# Patient Record
Sex: FEMALE | Race: OTHER | HISPANIC OR LATINO
[De-identification: names, ages, dates, MRNs, and addresses within clinical notes are randomized per-mention and may not be internally consistent; named-entity substitution may affect disease eponyms.]

---

## 2022-11-18 VITALS — BODY MASS INDEX: 45.85 KG/M2 | WEIGHT: 288.69 LBS | HEIGHT: 66.5 IN

## 2022-12-09 VITALS — BODY MASS INDEX: 45.74 KG/M2 | WEIGHT: 288 LBS | HEIGHT: 66.5 IN

## 2023-01-15 VITALS — BODY MASS INDEX: 45.42 KG/M2 | HEIGHT: 66.5 IN | WEIGHT: 286 LBS

## 2023-02-07 ENCOUNTER — NON-APPOINTMENT (OUTPATIENT)
Age: 38
End: 2023-02-07

## 2023-02-08 ENCOUNTER — APPOINTMENT (OUTPATIENT)
Dept: BARIATRICS | Facility: CLINIC | Age: 38
End: 2023-02-08
Payer: MEDICAID

## 2023-02-08 VITALS
TEMPERATURE: 97.3 F | HEART RATE: 67 BPM | WEIGHT: 291 LBS | SYSTOLIC BLOOD PRESSURE: 125 MMHG | OXYGEN SATURATION: 98 % | HEIGHT: 66.5 IN | DIASTOLIC BLOOD PRESSURE: 83 MMHG | BODY MASS INDEX: 46.22 KG/M2

## 2023-02-08 PROCEDURE — 99204 OFFICE O/P NEW MOD 45 MIN: CPT

## 2023-02-08 NOTE — HISTORY OF PRESENT ILLNESS
[de-identified] : Pt is a 36 y/o F with pmhx of morbid obesity BMI 46, asthma on albuterol prn, who presents today feeling well here for consultation for bariatric sx. Pt states she has struggled with her wt after giving birth to her 6th child a few years ago, states she has 9 children. Reports past sxhx of 2 c sections of spinal surgery for hematoma evacuation following a fall. Denies any reflux or regurgitation. States she quit smoking 5 yrs ago, reports social alcohol use. 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 States she has seen our online seminar and is interested in the VSG. We discussed at length surgical and non-surgical options and that non surgical approaches are unlikely to lead to long term, sustained weight loss. We also discussed that surgery alone is unlikely to be successful but should rather be seen as a tool for weight loss to be integrated with physical activity and nutritional counseling. The patient verbalized understanding and agrees to proceed with the evaluation. All risks of surgery were explained to the patient including the risks of leaks, infections, blood clots and death.\par \par

## 2023-02-08 NOTE — ADDENDUM
[FreeTextEntry1] : It was my pleasure to interact with Ms. BIPIN CAMPOVERDE today. In summary, Ms. BIPIN CAMPOVERDE has morbid obesity refractory to medical and dietary efforts for which She could benefit from weight loss surgery.  We discussed in detail the Mary-en-Y gastric bypass, sleeve gastrectomy, and modified duodenal switch (KAYLEE/SIPS). She understood that these procedures are done primarily minimally invasively (laparoscopically or robotically), but that there is a possibility of conversion to laparotomy. In this particular case, with their body mass index we discussed realistic expectations with respect to weight loss.  Approximately 50% of excess weight will be lost if She has gastric bypass or sleeve gastrectomy, or, approximately 60% if She  has KAYLEE/SIPS.  In order to maintain weight loss or lose more weight, She will be required to modify diet and exercise habits (the "tool" concept of weight loss surgery).  We discussed the necessity for continuous, life-long medical care following surgery and the necessity for taking mineral and vitamin supplements daily for the rest of life. We discussed the importance of high protein diet and daily exercise for maximal success.\par \par We discussed complications that may follow bariatric surgery that include, but are not limited to, respiratory problems, pneumonia, thrombophlebitis, and pulmonary embolus.  We also discussed intra-abdominal complications including anastomotic leak, intra-abdominal infections, portal vein thrombosis and death that may result from bariatric surgery.  We discussed that there may be other complications related to a specific type of surgery, such as that gastric bypass patients may have severe dumping secondary to dietary indiscretion. With respect to sleeve gastrectomy we discussed the chances of having refractory GERD that may require intervention in the future including conversion to gastric bypass. We also discussed postoperative DVT prophylaxis to decrease the incidence of deep vein thrombosis when indicated. \par \par I specifically addressed the patient regarding pregnancy.  Weight loss surgery patients should not become pregnant in the first two years following surgery because of the high risk of fetal malformation and miscarriage.\par \par The prolonged discussion (greater than 45 minutes) centered primarily on the indications for surgery and evaluation of the risk/benefit ratio for Ms. BIPIN CAMPOVERDE and other weight loss alternatives. I answered all questions about bariatric surgery and possible complications to the best of my ability.\par \par Once the preoperative evaluation is complete, I will review the chart and schedule the patient for a follow-up visit in order to answer any questions prior to scheduling surgery.\par \par Plan:\par Interested in sleeve gastrectomy\par Nutrition, Psych evaluations\par Pulmonary evaluations\par EGD\par

## 2023-02-27 ENCOUNTER — APPOINTMENT (OUTPATIENT)
Dept: BARIATRICS | Facility: CLINIC | Age: 38
End: 2023-02-27

## 2023-03-03 ENCOUNTER — NON-APPOINTMENT (OUTPATIENT)
Age: 38
End: 2023-03-03

## 2023-03-04 ENCOUNTER — EMERGENCY (EMERGENCY)
Facility: HOSPITAL | Age: 38
LOS: 1 days | Discharge: LEFT BEFORE DISCHARGE | End: 2023-03-04
Admitting: EMERGENCY MEDICINE
Payer: COMMERCIAL

## 2023-03-04 VITALS
SYSTOLIC BLOOD PRESSURE: 115 MMHG | HEART RATE: 69 BPM | WEIGHT: 291.89 LBS | TEMPERATURE: 99 F | DIASTOLIC BLOOD PRESSURE: 65 MMHG | RESPIRATION RATE: 18 BRPM | OXYGEN SATURATION: 97 %

## 2023-03-04 LAB — SARS-COV-2 RNA SPEC QL NAA+PROBE: SIGNIFICANT CHANGE UP

## 2023-03-04 PROCEDURE — 87635 SARS-COV-2 COVID-19 AMP PRB: CPT

## 2023-03-04 PROCEDURE — 99284 EMERGENCY DEPT VISIT MOD MDM: CPT

## 2023-03-04 PROCEDURE — 99282 EMERGENCY DEPT VISIT SF MDM: CPT

## 2023-03-04 NOTE — ED ADULT TRIAGE NOTE - CAS EDN DISCHARGE ASSESSMENT
seen by CRYS Whitaker and discharged.  pt here for covid test only, pre procedure./Alert and oriented to person, place and time

## 2023-03-04 NOTE — ED PROVIDER NOTE - PATIENT PORTAL LINK FT
You can access the FollowMyHealth Patient Portal offered by  by registering at the following website: [email or web address]. By joining [facility]’s FollowMyHealth portal, you will also be able to view your health information using other applications (apps) compatible with our system.

## 2023-03-06 ENCOUNTER — RESULT REVIEW (OUTPATIENT)
Age: 38
End: 2023-03-06

## 2023-03-06 ENCOUNTER — TRANSCRIPTION ENCOUNTER (OUTPATIENT)
Age: 38
End: 2023-03-06

## 2023-03-06 ENCOUNTER — OUTPATIENT (OUTPATIENT)
Dept: OUTPATIENT SERVICES | Facility: HOSPITAL | Age: 38
LOS: 1 days | Discharge: HOME | End: 2023-03-06
Payer: COMMERCIAL

## 2023-03-06 VITALS — WEIGHT: 289.91 LBS

## 2023-03-06 PROCEDURE — 88305 TISSUE EXAM BY PATHOLOGIST: CPT

## 2023-03-06 PROCEDURE — 88300 SURGICAL PATH GROSS: CPT

## 2023-03-06 PROCEDURE — 88300 SURGICAL PATH GROSS: CPT | Mod: 26,59

## 2023-03-06 PROCEDURE — 43239 EGD BIOPSY SINGLE/MULTIPLE: CPT

## 2023-03-06 PROCEDURE — 88305 TISSUE EXAM BY PATHOLOGIST: CPT | Mod: 26

## 2023-03-07 DIAGNOSIS — Z20.822 CONTACT WITH AND (SUSPECTED) EXPOSURE TO COVID-19: ICD-10-CM

## 2023-03-07 DIAGNOSIS — Z88.0 ALLERGY STATUS TO PENICILLIN: ICD-10-CM

## 2023-03-07 DIAGNOSIS — Z53.29 PROCEDURE AND TREATMENT NOT CARRIED OUT BECAUSE OF PATIENT'S DECISION FOR OTHER REASONS: ICD-10-CM

## 2023-03-07 LAB — SURGICAL PATHOLOGY STUDY: SIGNIFICANT CHANGE UP

## 2023-03-09 VITALS — HEIGHT: 66.5 IN | WEIGHT: 286 LBS | BODY MASS INDEX: 45.42 KG/M2

## 2023-03-10 ENCOUNTER — APPOINTMENT (OUTPATIENT)
Dept: HEART AND VASCULAR | Facility: CLINIC | Age: 38
End: 2023-03-10

## 2023-03-10 DIAGNOSIS — Z01.810 ENCOUNTER FOR PREPROCEDURAL CARDIOVASCULAR EXAMINATION: ICD-10-CM

## 2023-03-10 DIAGNOSIS — R06.02 SHORTNESS OF BREATH: ICD-10-CM

## 2023-03-11 ENCOUNTER — NON-APPOINTMENT (OUTPATIENT)
Age: 38
End: 2023-03-11

## 2023-03-17 DIAGNOSIS — E55.9 VITAMIN D DEFICIENCY, UNSPECIFIED: ICD-10-CM

## 2023-03-17 LAB
25(OH)D3 SERPL-MCNC: 6.4 NG/ML
ALBUMIN SERPL ELPH-MCNC: 4.3 G/DL
ALP BLD-CCNC: 69 U/L
ALT SERPL-CCNC: 10 U/L
ANION GAP SERPL CALC-SCNC: 11 MMOL/L
APPEARANCE: CLEAR
AST SERPL-CCNC: 14 U/L
BACTERIA: NEGATIVE
BASOPHILS # BLD AUTO: 0.05 K/UL
BASOPHILS NFR BLD AUTO: 0.6 %
BILIRUB SERPL-MCNC: 0.4 MG/DL
BILIRUBIN URINE: NEGATIVE
BLOOD URINE: ABNORMAL
BUN SERPL-MCNC: 9 MG/DL
CA-I SERPL-SCNC: 4.9 MG/DL
CALCIUM SERPL-MCNC: 9 MG/DL
CALCIUM SERPL-MCNC: 9 MG/DL
CHLORIDE SERPL-SCNC: 102 MMOL/L
CHOLEST SERPL-MCNC: 124 MG/DL
CO2 SERPL-SCNC: 25 MMOL/L
COLOR: YELLOW
CREAT SERPL-MCNC: 0.74 MG/DL
EGFR: 106 ML/MIN/1.73M2
EOSINOPHIL # BLD AUTO: 0.14 K/UL
EOSINOPHIL NFR BLD AUTO: 1.7 %
ESTIMATED AVERAGE GLUCOSE: 105 MG/DL
FOLATE SERPL-MCNC: 2.4 NG/ML
GLUCOSE QUALITATIVE U: NEGATIVE
GLUCOSE SERPL-MCNC: 86 MG/DL
HBA1C MFR BLD HPLC: 5.3 %
HCG SERPL QL: NEGATIVE
HCT VFR BLD CALC: 40.1 %
HDLC SERPL-MCNC: 46 MG/DL
HGB BLD-MCNC: 12.8 G/DL
HYALINE CASTS: 2 /LPF
IMM GRANULOCYTES NFR BLD AUTO: 0.2 %
INR PPP: 1.11 RATIO
IRON SATN MFR SERPL: 17 %
IRON SERPL-MCNC: 55 UG/DL
KETONES URINE: NEGATIVE
LDLC SERPL CALC-MCNC: 65 MG/DL
LEUKOCYTE ESTERASE URINE: NEGATIVE
LYMPHOCYTES # BLD AUTO: 2.56 K/UL
LYMPHOCYTES NFR BLD AUTO: 31.6 %
MAN DIFF?: NORMAL
MCHC RBC-ENTMCNC: 28.1 PG
MCHC RBC-ENTMCNC: 31.9 GM/DL
MCV RBC AUTO: 88.1 FL
MICROSCOPIC-UA: NORMAL
MONOCYTES # BLD AUTO: 0.54 K/UL
MONOCYTES NFR BLD AUTO: 6.7 %
NEUTROPHILS # BLD AUTO: 4.79 K/UL
NEUTROPHILS NFR BLD AUTO: 59.2 %
NITRITE URINE: NEGATIVE
NONHDLC SERPL-MCNC: 78 MG/DL
PAPP-A SERPL-ACNC: <1 MIU/ML
PARATHYROID HORMONE INTACT: 115 PG/ML
PH URINE: 7
PLATELET # BLD AUTO: 331 K/UL
POTASSIUM SERPL-SCNC: 4.1 MMOL/L
PREALB SERPL NEPH-MCNC: 15 MG/DL
PROT SERPL-MCNC: 7.5 G/DL
PROTEIN URINE: ABNORMAL
PT BLD: 12.9 SEC
RBC # BLD: 4.55 M/UL
RBC # FLD: 12 %
RED BLOOD CELLS URINE: 20 /HPF
SODIUM SERPL-SCNC: 138 MMOL/L
SPECIFIC GRAVITY URINE: 1.03
SQUAMOUS EPITHELIAL CELLS: 6 /HPF
TIBC SERPL-MCNC: 327 UG/DL
TRIGL SERPL-MCNC: 65 MG/DL
TSH SERPL-ACNC: 3.03 UIU/ML
UIBC SERPL-MCNC: 272 UG/DL
UROBILINOGEN URINE: ABNORMAL
VIT B1 SERPL-MCNC: 116.1 NMOL/L
VIT B12 SERPL-MCNC: 265 PG/ML
WBC # FLD AUTO: 8.1 K/UL
WHITE BLOOD CELLS URINE: 2 /HPF
ZINC SERPL-MCNC: 59 UG/DL

## 2023-03-17 RX ORDER — FOLIC ACID 1 MG/1
1 TABLET ORAL DAILY
Qty: 30 | Refills: 0 | Status: ACTIVE | COMMUNITY
Start: 2023-03-17 | End: 1900-01-01

## 2023-03-17 RX ORDER — ERGOCALCIFEROL 1.25 MG/1
1.25 MG CAPSULE, LIQUID FILLED ORAL
Qty: 12 | Refills: 0 | Status: ACTIVE | COMMUNITY
Start: 2023-03-17 | End: 1900-01-01

## 2023-03-22 ENCOUNTER — APPOINTMENT (OUTPATIENT)
Dept: PULMONOLOGY | Facility: CLINIC | Age: 38
End: 2023-03-22
Payer: MEDICAID

## 2023-03-22 ENCOUNTER — TRANSCRIPTION ENCOUNTER (OUTPATIENT)
Age: 38
End: 2023-03-22

## 2023-03-22 VITALS
SYSTOLIC BLOOD PRESSURE: 121 MMHG | DIASTOLIC BLOOD PRESSURE: 79 MMHG | TEMPERATURE: 97.3 F | HEIGHT: 66.5 IN | OXYGEN SATURATION: 97 % | BODY MASS INDEX: 46.37 KG/M2 | WEIGHT: 292 LBS | HEART RATE: 67 BPM

## 2023-03-22 PROCEDURE — 99204 OFFICE O/P NEW MOD 45 MIN: CPT | Mod: 25

## 2023-03-22 PROCEDURE — 94727 GAS DIL/WSHOT DETER LNG VOL: CPT | Mod: 26

## 2023-03-22 PROCEDURE — 94729 DIFFUSING CAPACITY: CPT | Mod: 26

## 2023-03-22 PROCEDURE — ZZZZZ: CPT

## 2023-03-22 PROCEDURE — 94010 BREATHING CAPACITY TEST: CPT | Mod: 26

## 2023-03-22 NOTE — ASSESSMENT
[FreeTextEntry1] : 36 y/o obese F with snoring who is going under bariatric surgery clearance and now is here for initial visit and PFT.  Denies snoring or witnessed apnea, little excessive daytime somnolence despite very limited sleep time.  Based on history and physical exam, sleep disordered breathing is at least moderately likely.  The patient was advised to have overnight unattended home sleep testing as a first approach.  If this is not definitive may need overnight polysomnography. Will be seen in follow up after testing.

## 2023-03-22 NOTE — HISTORY OF PRESENT ILLNESS
[FreeTextEntry1] : 03/22/2023 :  BIPIN CAMPOVERDE is a 37 year old female with PMHx asthma ( well controlled only on albuterol once/month), obesity who is here for initial visit for bariatric surgery clearance. \par \par She snores but denies apnea and has occasional dry mouth. Her  mother with SANDHYA on CPAP \par \par Sleep Routine:\par \par She goes to bed at 2A, sleep latency is about 1 min, she wakes up once WASO is about 1 min, and then she is up at 6A. She naps twice/week x 30 min . [place] sleepiness scale (out of 24 points) = 8 \par \par She gained about 6lb in few weeks. She denies cataplexy, RLS, parasomnia, or any other sleep behavioral issues. \par   \par Noo hx smoking, asthma, or any other pulmonary disease.\par

## 2023-03-22 NOTE — PHYSICAL EXAM
[General Appearance - In No Acute Distress] : no acute distress [Micrognathia] : micrognathia [III] : III [Apical Impulse] : the apical impulse was normal [Heart Sounds] : normal S1 and S2 [] : no respiratory distress [Exaggerated Use Of Accessory Muscles For Inspiration] : no accessory muscle use [Abnormal Walk] : normal gait [Involuntary Movements] : no involuntary movements were seen [No Focal Deficits] : no focal deficits [Oriented To Time, Place, And Person] : oriented to person, place, and time [Affect] : the affect was normal [FreeTextEntry1] : large neck

## 2023-03-22 NOTE — REVIEW OF SYSTEMS
[EDS: ESS=____] : daytime somnolence: ESS=[unfilled] [Snoring] : snoring [Obesity] : obesity [Negative] : Psychiatric

## 2023-03-30 ENCOUNTER — NON-APPOINTMENT (OUTPATIENT)
Age: 38
End: 2023-03-30

## 2023-04-03 ENCOUNTER — NON-APPOINTMENT (OUTPATIENT)
Age: 38
End: 2023-04-03

## 2023-04-03 LAB
A-TOCOPHEROL VIT E SERPL-MCNC: 5.9 MG/L
BETA+GAMMA TOCOPHEROL SERPL-MCNC: 1.5 MG/L
UREA BREATH TEST QL: NEGATIVE
VIT A SERPL-MCNC: 17.8 UG/DL

## 2023-04-10 ENCOUNTER — OUTPATIENT (OUTPATIENT)
Dept: OUTPATIENT SERVICES | Facility: HOSPITAL | Age: 38
LOS: 1 days | End: 2023-04-10
Payer: COMMERCIAL

## 2023-04-10 ENCOUNTER — APPOINTMENT (OUTPATIENT)
Dept: SLEEP CENTER | Facility: HOME HEALTH | Age: 38
End: 2023-04-10
Payer: MEDICAID

## 2023-04-10 VITALS — BODY MASS INDEX: 45.76 KG/M2 | HEIGHT: 66.5 IN | WEIGHT: 288.13 LBS

## 2023-04-10 PROCEDURE — 95800 SLP STDY UNATTENDED: CPT | Mod: 26

## 2023-04-10 PROCEDURE — 95800 SLP STDY UNATTENDED: CPT

## 2023-04-12 DIAGNOSIS — G47.33 OBSTRUCTIVE SLEEP APNEA (ADULT) (PEDIATRIC): ICD-10-CM

## 2023-04-27 ENCOUNTER — APPOINTMENT (OUTPATIENT)
Dept: PULMONOLOGY | Facility: CLINIC | Age: 38
End: 2023-04-27
Payer: MEDICAID

## 2023-04-27 DIAGNOSIS — Z01.818 ENCOUNTER FOR OTHER PREPROCEDURAL EXAMINATION: ICD-10-CM

## 2023-04-27 DIAGNOSIS — R06.83 SNORING: ICD-10-CM

## 2023-04-27 PROCEDURE — 99442: CPT

## 2023-04-27 NOTE — ASSESSMENT
[FreeTextEntry1] : Mild sleep disordered breathing.  I do not think she needs treatment for this at this time and expect that if she undergoes bariatric surgery and loses weight this will improve.  3 of asthma but normal pulmonary function testing.\par \par Cleared for bariatric surgery from our perspective.

## 2023-05-03 ENCOUNTER — APPOINTMENT (OUTPATIENT)
Dept: BARIATRICS | Facility: CLINIC | Age: 38
End: 2023-05-03
Payer: MEDICAID

## 2023-05-03 ENCOUNTER — LABORATORY RESULT (OUTPATIENT)
Age: 38
End: 2023-05-03

## 2023-05-03 VITALS
HEART RATE: 65 BPM | OXYGEN SATURATION: 99 % | SYSTOLIC BLOOD PRESSURE: 135 MMHG | HEIGHT: 66.5 IN | WEIGHT: 292 LBS | TEMPERATURE: 97.3 F | BODY MASS INDEX: 46.37 KG/M2 | DIASTOLIC BLOOD PRESSURE: 87 MMHG

## 2023-05-03 DIAGNOSIS — J45.909 UNSPECIFIED ASTHMA, UNCOMPLICATED: ICD-10-CM

## 2023-05-03 DIAGNOSIS — E66.01 MORBID (SEVERE) OBESITY DUE TO EXCESS CALORIES: ICD-10-CM

## 2023-05-03 PROCEDURE — 99214 OFFICE O/P EST MOD 30 MIN: CPT

## 2023-05-04 NOTE — ASSESSMENT
[FreeTextEntry1] : Pt is a 36 y/o F with pmhx of morbid obesity BMI 46, asthma, who presents today feeling well here for final visit for bariatric sx. Pt has completed her workup for surgery and has obtained all of the necessary clearances and weigh ins. Pt EGD reviewed, no evidence of HH and path overall wnl. Pt expresses interest in VSG. Pt understands the risk of GERD occurring post op and wishes to proceed. We discussed at length surgical and non-surgical options and that non surgical approaches are unlikely to lead to long term, sustained weight loss. We also discussed that surgery alone is unlikely to be successful but should rather be seen as a tool for weight loss to be integrated with physical activity and nutritional counseling. The patient verbalized understanding and agrees to proceed with the evaluation. All risks of surgery were explained to the patient including the risks of leaks, infections, blood clots and death.

## 2023-05-04 NOTE — ADDENDUM
[FreeTextEntry1] : It was my pleasure to interact with Ms. BIPIN CAMPOVERDE today. In summary, Ms. BIPIN CAMPOVERDE has morbid obesity refractory to medical and dietary efforts for which She could benefit from weight loss surgery.  We discussed in detail the Mary-en-Y gastric bypass, sleeve gastrectomy, and modified duodenal switch (KAYLEE/SIPS). She understood that these procedures are done primarily minimally invasively (laparoscopically or robotically), but that there is a possibility of conversion to laparotomy. In this particular case, with their body mass index we discussed realistic expectations with respect to weight loss.  Approximately 50% of excess weight will be lost if She has gastric bypass or sleeve gastrectomy, or, approximately 60% if She  has KAYLEE/SIPS.  In order to maintain weight loss or lose more weight, She will be required to modify diet and exercise habits (the "tool" concept of weight loss surgery).  We discussed the necessity for continuous, life-long medical care following surgery and the necessity for taking mineral and vitamin supplements daily for the rest of life. We discussed the importance of high protein diet and daily exercise for maximal success.\par \par We discussed complications that may follow bariatric surgery that include, but are not limited to, respiratory problems, pneumonia, thrombophlebitis, and pulmonary embolus.  We also discussed intra-abdominal complications including anastomotic leak, intra-abdominal infections, portal vein thrombosis and death that may result from bariatric surgery.  We discussed that there may be other complications related to a specific type of surgery, such as that gastric bypass patients may have severe dumping secondary to dietary indiscretion. With respect to sleeve gastrectomy we discussed the chances of having refractory GERD that may require intervention in the future including conversion to gastric bypass. We also discussed postoperative DVT prophylaxis to decrease the incidence of deep vein thrombosis when indicated. \par \par I specifically addressed the patient regarding pregnancy.  Weight loss surgery patients should not become pregnant in the first two years following surgery because of the high risk of fetal malformation and miscarriage.\par \par The prolonged discussion (greater than 50 minutes) centered primarily on the indications for surgery and evaluation of the risk/benefit ratio for Ms. BIPIN CAMPOVERDE and other weight loss alternatives. I answered all questions about bariatric surgery and possible complications to the best of my ability.\par \par Once the preoperative evaluation is complete, I will review the chart and schedule the patient for a follow-up visit in order to answer any questions prior to scheduling surgery.\par \par Plan:\par robotic assisted sleeve gastrectomy, possible hiatal hernia repair\par verify preoperative clearances\par \par I, Dr. Gi Joyce, spent 35 minutes with the patient >50% counseling/coordination of care including, reviewing the patient's history, performing an examination, reviewing relevant labs and radiographic imaging, reviewing PCP and consultant notes, discussion of medical and surgical management of the diagnosis as well as associated risks and benefits, and completing documentation.\par

## 2023-05-04 NOTE — HISTORY OF PRESENT ILLNESS
[de-identified] : Pt is a 36 y/o F with pmhx of morbid obesity BMI 46, asthma, who presents today feeling well here for final visit for bariatric sx. Pt has completed her workup for surgery and has obtained all of the necessary clearances and weigh ins. Pt EGD reviewed, no evidence of HH and path overall wnl. Pt expresses interest in VSG. Pt understands the risk of GERD occurring post op and wishes to proceed. We discussed at length surgical and non-surgical options and that non surgical approaches are unlikely to lead to long term, sustained weight loss. We also discussed that surgery alone is unlikely to be successful but should rather be seen as a tool for weight loss to be integrated with physical activity and nutritional counseling. The patient verbalized understanding and agrees to proceed with the evaluation. All risks of surgery were explained to the patient including the risks of leaks, infections, blood clots and death.\par

## 2023-05-16 ENCOUNTER — APPOINTMENT (OUTPATIENT)
Dept: BARIATRICS | Facility: CLINIC | Age: 38
End: 2023-05-16

## 2023-05-16 ENCOUNTER — NON-APPOINTMENT (OUTPATIENT)
Age: 38
End: 2023-05-16

## 2023-05-19 ENCOUNTER — RESULT REVIEW (OUTPATIENT)
Age: 38
End: 2023-05-19

## 2023-05-19 ENCOUNTER — OUTPATIENT (OUTPATIENT)
Dept: OUTPATIENT SERVICES | Facility: HOSPITAL | Age: 38
LOS: 1 days | End: 2023-05-19
Payer: COMMERCIAL

## 2023-05-19 PROCEDURE — 71046 X-RAY EXAM CHEST 2 VIEWS: CPT | Mod: 26

## 2023-05-19 PROCEDURE — 71046 X-RAY EXAM CHEST 2 VIEWS: CPT

## 2023-05-25 ENCOUNTER — LABORATORY RESULT (OUTPATIENT)
Age: 38
End: 2023-05-25

## 2023-05-26 VITALS
RESPIRATION RATE: 17 BRPM | HEART RATE: 75 BPM | WEIGHT: 293 LBS | OXYGEN SATURATION: 97 % | HEIGHT: 67 IN | DIASTOLIC BLOOD PRESSURE: 80 MMHG | TEMPERATURE: 97 F | SYSTOLIC BLOOD PRESSURE: 123 MMHG

## 2023-05-26 NOTE — PATIENT PROFILE ADULT - FALL HARM RISK - UNIVERSAL INTERVENTIONS
Bed in lowest position, wheels locked, appropriate side rails in place/Call bell, personal items and telephone in reach/Instruct patient to call for assistance before getting out of bed or chair/Non-slip footwear when patient is out of bed/[place] to call system/Physically safe environment - no spills, clutter or unnecessary equipment/Purposeful Proactive Rounding/Room/bathroom lighting operational, light cord in reach

## 2023-05-29 ENCOUNTER — TRANSCRIPTION ENCOUNTER (OUTPATIENT)
Age: 38
End: 2023-05-29

## 2023-05-30 ENCOUNTER — APPOINTMENT (OUTPATIENT)
Dept: BARIATRICS | Facility: HOSPITAL | Age: 38
End: 2023-05-30

## 2023-05-30 ENCOUNTER — RESULT REVIEW (OUTPATIENT)
Age: 38
End: 2023-05-30

## 2023-05-30 ENCOUNTER — TRANSCRIPTION ENCOUNTER (OUTPATIENT)
Age: 38
End: 2023-05-30

## 2023-05-30 ENCOUNTER — INPATIENT (INPATIENT)
Facility: HOSPITAL | Age: 38
LOS: 2 days | Discharge: HOME | DRG: 621 | End: 2023-06-02
Attending: STUDENT IN AN ORGANIZED HEALTH CARE EDUCATION/TRAINING PROGRAM | Admitting: STUDENT IN AN ORGANIZED HEALTH CARE EDUCATION/TRAINING PROGRAM
Payer: COMMERCIAL

## 2023-05-30 DIAGNOSIS — Z98.890 OTHER SPECIFIED POSTPROCEDURAL STATES: Chronic | ICD-10-CM

## 2023-05-30 DIAGNOSIS — Z98.891 HISTORY OF UTERINE SCAR FROM PREVIOUS SURGERY: Chronic | ICD-10-CM

## 2023-05-30 LAB
BLD GP AB SCN SERPL QL: NEGATIVE — SIGNIFICANT CHANGE UP
HCT VFR BLD CALC: 37.7 % — SIGNIFICANT CHANGE UP (ref 34.5–45)
HGB BLD-MCNC: 12.2 G/DL — SIGNIFICANT CHANGE UP (ref 11.5–15.5)
MCHC RBC-ENTMCNC: 27.9 PG — SIGNIFICANT CHANGE UP (ref 27–34)
MCHC RBC-ENTMCNC: 32.4 GM/DL — SIGNIFICANT CHANGE UP (ref 32–36)
MCV RBC AUTO: 86.3 FL — SIGNIFICANT CHANGE UP (ref 80–100)
NRBC # BLD: 0 /100 WBCS — SIGNIFICANT CHANGE UP (ref 0–0)
PLATELET # BLD AUTO: 287 K/UL — SIGNIFICANT CHANGE UP (ref 150–400)
RBC # BLD: 4.37 M/UL — SIGNIFICANT CHANGE UP (ref 3.8–5.2)
RBC # FLD: 11.9 % — SIGNIFICANT CHANGE UP (ref 10.3–14.5)
RH IG SCN BLD-IMP: POSITIVE — SIGNIFICANT CHANGE UP
WBC # BLD: 12.23 K/UL — HIGH (ref 3.8–10.5)
WBC # FLD AUTO: 12.23 K/UL — HIGH (ref 3.8–10.5)

## 2023-05-30 PROCEDURE — 88307 TISSUE EXAM BY PATHOLOGIST: CPT | Mod: 26

## 2023-05-30 PROCEDURE — S2900 ROBOTIC SURGICAL SYSTEM: CPT | Mod: NC

## 2023-05-30 PROCEDURE — 88342 IMHCHEM/IMCYTCHM 1ST ANTB: CPT | Mod: 26

## 2023-05-30 PROCEDURE — 43775 LAP SLEEVE GASTRECTOMY: CPT

## 2023-05-30 PROCEDURE — 43775 LAP SLEEVE GASTRECTOMY: CPT | Mod: AS

## 2023-05-30 DEVICE — XI STAPLER SUREFORM RELOAD 60 BLUE: Type: IMPLANTABLE DEVICE | Status: FUNCTIONAL

## 2023-05-30 DEVICE — XI STAPLER SUREFORM RELOAD 60 GREEN: Type: IMPLANTABLE DEVICE | Status: FUNCTIONAL

## 2023-05-30 DEVICE — ARISTA 3GR: Type: IMPLANTABLE DEVICE | Status: FUNCTIONAL

## 2023-05-30 RX ORDER — ALBUTEROL 90 UG/1
2 AEROSOL, METERED ORAL
Refills: 0 | DISCHARGE

## 2023-05-30 RX ORDER — ENOXAPARIN SODIUM 100 MG/ML
40 INJECTION SUBCUTANEOUS ONCE
Refills: 0 | Status: COMPLETED | OUTPATIENT
Start: 2023-05-30 | End: 2023-05-30

## 2023-05-30 RX ORDER — PANTOPRAZOLE SODIUM 20 MG/1
40 TABLET, DELAYED RELEASE ORAL DAILY
Refills: 0 | Status: DISCONTINUED | OUTPATIENT
Start: 2023-05-30 | End: 2023-06-02

## 2023-05-30 RX ORDER — GABAPENTIN 400 MG/1
0 CAPSULE ORAL
Refills: 0 | DISCHARGE

## 2023-05-30 RX ORDER — THIAMINE MONONITRATE (VIT B1) 100 MG
500 TABLET ORAL DAILY
Refills: 0 | Status: COMPLETED | OUTPATIENT
Start: 2023-05-30 | End: 2023-06-01

## 2023-05-30 RX ORDER — SCOPALAMINE 1 MG/3D
1 PATCH, EXTENDED RELEASE TRANSDERMAL ONCE
Refills: 0 | Status: COMPLETED | OUTPATIENT
Start: 2023-05-30 | End: 2023-05-30

## 2023-05-30 RX ORDER — ACETAMINOPHEN 500 MG
1000 TABLET ORAL ONCE
Refills: 0 | Status: COMPLETED | OUTPATIENT
Start: 2023-05-30 | End: 2023-05-30

## 2023-05-30 RX ORDER — HYOSCYAMINE SULFATE 0.13 MG
0.12 TABLET ORAL EVERY 6 HOURS
Refills: 0 | Status: DISCONTINUED | OUTPATIENT
Start: 2023-05-30 | End: 2023-06-02

## 2023-05-30 RX ORDER — ONDANSETRON 8 MG/1
4 TABLET, FILM COATED ORAL ONCE
Refills: 0 | Status: COMPLETED | OUTPATIENT
Start: 2023-05-30 | End: 2023-05-30

## 2023-05-30 RX ORDER — ONDANSETRON 8 MG/1
4 TABLET, FILM COATED ORAL EVERY 6 HOURS
Refills: 0 | Status: DISCONTINUED | OUTPATIENT
Start: 2023-05-30 | End: 2023-06-02

## 2023-05-30 RX ORDER — ALBUTEROL 90 UG/1
2 AEROSOL, METERED ORAL EVERY 6 HOURS
Refills: 0 | Status: DISCONTINUED | OUTPATIENT
Start: 2023-05-30 | End: 2023-06-02

## 2023-05-30 RX ORDER — HYDROMORPHONE HYDROCHLORIDE 2 MG/ML
0.5 INJECTION INTRAMUSCULAR; INTRAVENOUS; SUBCUTANEOUS
Refills: 0 | Status: DISCONTINUED | OUTPATIENT
Start: 2023-05-30 | End: 2023-05-30

## 2023-05-30 RX ORDER — HYDROMORPHONE HYDROCHLORIDE 2 MG/ML
0.25 INJECTION INTRAMUSCULAR; INTRAVENOUS; SUBCUTANEOUS ONCE
Refills: 0 | Status: DISCONTINUED | OUTPATIENT
Start: 2023-05-30 | End: 2023-05-30

## 2023-05-30 RX ORDER — ACETAMINOPHEN 500 MG
650 TABLET ORAL EVERY 6 HOURS
Refills: 0 | Status: DISCONTINUED | OUTPATIENT
Start: 2023-05-30 | End: 2023-06-01

## 2023-05-30 RX ORDER — SODIUM CHLORIDE 9 MG/ML
1000 INJECTION, SOLUTION INTRAVENOUS
Refills: 0 | Status: DISCONTINUED | OUTPATIENT
Start: 2023-05-30 | End: 2023-05-31

## 2023-05-30 RX ADMIN — ONDANSETRON 4 MILLIGRAM(S): 8 TABLET, FILM COATED ORAL at 17:22

## 2023-05-30 RX ADMIN — Medication 400 MILLIGRAM(S): at 18:14

## 2023-05-30 RX ADMIN — HYDROMORPHONE HYDROCHLORIDE 0.5 MILLIGRAM(S): 2 INJECTION INTRAMUSCULAR; INTRAVENOUS; SUBCUTANEOUS at 17:23

## 2023-05-30 RX ADMIN — Medication 0.12 MILLIGRAM(S): at 22:27

## 2023-05-30 RX ADMIN — ENOXAPARIN SODIUM 40 MILLIGRAM(S): 100 INJECTION SUBCUTANEOUS at 13:01

## 2023-05-30 RX ADMIN — SCOPALAMINE 1 PATCH: 1 PATCH, EXTENDED RELEASE TRANSDERMAL at 17:46

## 2023-05-30 RX ADMIN — Medication 1000 MILLIGRAM(S): at 13:02

## 2023-05-30 RX ADMIN — SODIUM CHLORIDE 175 MILLILITER(S): 9 INJECTION, SOLUTION INTRAVENOUS at 22:27

## 2023-05-30 RX ADMIN — HYDROMORPHONE HYDROCHLORIDE 0.5 MILLIGRAM(S): 2 INJECTION INTRAMUSCULAR; INTRAVENOUS; SUBCUTANEOUS at 18:09

## 2023-05-30 RX ADMIN — ONDANSETRON 4 MILLIGRAM(S): 8 TABLET, FILM COATED ORAL at 20:21

## 2023-05-30 RX ADMIN — PANTOPRAZOLE SODIUM 40 MILLIGRAM(S): 20 TABLET, DELAYED RELEASE ORAL at 17:10

## 2023-05-30 RX ADMIN — HYDROMORPHONE HYDROCHLORIDE 0.5 MILLIGRAM(S): 2 INJECTION INTRAMUSCULAR; INTRAVENOUS; SUBCUTANEOUS at 17:38

## 2023-05-30 RX ADMIN — HYDROMORPHONE HYDROCHLORIDE 0.25 MILLIGRAM(S): 2 INJECTION INTRAMUSCULAR; INTRAVENOUS; SUBCUTANEOUS at 21:44

## 2023-05-30 RX ADMIN — HYDROMORPHONE HYDROCHLORIDE 0.25 MILLIGRAM(S): 2 INJECTION INTRAMUSCULAR; INTRAVENOUS; SUBCUTANEOUS at 22:00

## 2023-05-30 RX ADMIN — HYDROMORPHONE HYDROCHLORIDE 0.5 MILLIGRAM(S): 2 INJECTION INTRAMUSCULAR; INTRAVENOUS; SUBCUTANEOUS at 17:57

## 2023-05-30 RX ADMIN — HYDROMORPHONE HYDROCHLORIDE 0.5 MILLIGRAM(S): 2 INJECTION INTRAMUSCULAR; INTRAVENOUS; SUBCUTANEOUS at 17:40

## 2023-05-30 RX ADMIN — SCOPALAMINE 1 PATCH: 1 PATCH, EXTENDED RELEASE TRANSDERMAL at 13:01

## 2023-05-30 NOTE — PROGRESS NOTE ADULT - SUBJECTIVE AND OBJECTIVE BOX
Procedure: Robot-assisted laparoscopic sleeve gastrectomy  Surgeon: Dr. Joyce    S: Pt seen and examined bedside. She is reporting gas pain and nausea. Emesis x1 in PACU. She has been up and walking. Tolerating minimal PO intake. Her surgical pain is controlled with medication. Denies CP, SOB, RODRIGUEZ, calf tenderness or edema, fever, or chills.     O:  T(C): 37.1 (05-30-23 @ 20:30), Max: 37.1 (05-30-23 @ 18:42)  T(F): 98.8 (05-30-23 @ 20:30), Max: 98.8 (05-30-23 @ 20:30)  HR: 76 (05-30-23 @ 20:30) (74 - 76)  BP: 129/86 (05-30-23 @ 20:30) (129/86 - 143/85)  RR: 17 (05-30-23 @ 20:30) (16 - 17)  SpO2: 95% (-30-23 @ 20:30) (95% - 96%)  Wt(kg): --                        12.2   12.23 )-----------( 287      ( 30 May 2023 22:22 )             37.7         Gen: NAD, resting in bed  C/V: NSR  Pulm: Nonlabored breathing, no respiratory distress  Abd: soft, NT/ND Incision: incisions are c/d/i  Extrem: WWP, no calf edema or tenderness      A/P: 37F with PMHx of MO (BMI 46), asthma, HTN, and psh of  section and back surgery who presents for elective weigthloss procedure. s/p LSG .      BCLD/IVF  Pain/nausea control PRN  Thiamine 500mg iv POD0 and POD1  Protonix 40mg iv qd  Hyocsyamine 0.125 mg q6h  Nutrition consult in AM  OOB/IS/SCD  SQH on POD1 after AM CBC

## 2023-05-30 NOTE — H&P ADULT - NSHPPHYSICALEXAM_GEN_ALL_CORE
Vital Signs Last 24 Hrs  T(C): 36.7 (30 May 2023 16:57), Max: 36.7 (30 May 2023 16:57)  T(F): 98 (30 May 2023 16:57), Max: 98 (30 May 2023 16:57)  HR: 68 (30 May 2023 17:07) (68 - 75)  BP: 141/71 (30 May 2023 17:02) (123/80 - 148/74)  BP(mean): 100 (30 May 2023 17:02) (100 - 104)  RR: 15 (30 May 2023 17:07) (15 - 17)  SpO2: 97% (30 May 2023 17:07) (95% - 97%)    Parameters below as of 30 May 2023 16:57  Patient On (Oxygen Delivery Method): nasal cannula  O2 Flow (L/min): 2    I&O's Detail      General: NAD, resting comfortably in bed  C/V: NSR  Pulm: Nonlabored breathing, no respiratory distress  Abd: soft, NT/ND.  Extrem: WWP, no edema

## 2023-05-30 NOTE — H&P ADULT - HISTORY OF PRESENT ILLNESS
HPI:  37F with PMHx of MO (BMI 46), asthma, HTN, and psh of  section and back surgery who presents for elective weigthloss procedure. Preop EGD negative for HHR and GERD.     PMH: MO (BMI 46), asthma, HTN  PSH:  section, back surgery  Allergies: penicillin  Medications: albuterol, gabapentin  SH: Former smoker, quit 5 years ago, Social EtOH use

## 2023-05-30 NOTE — H&P ADULT - NSICDXPASTSURGICALHX_GEN_ALL_CORE_FT
PAST SURGICAL HISTORY:  H/O:  ,     History of back surgery hematoma removal from the lower  back    History of D&C

## 2023-05-30 NOTE — H&P ADULT - NSICDXPASTMEDICALHX_GEN_ALL_CORE_FT
PAST MEDICAL HISTORY:  Acne     Asthma mild intermittent    Fall fallen from stairs, injury to the back in 2010    HTN (hypertension)

## 2023-05-30 NOTE — PRE-ANESTHESIA EVALUATION ADULT - NSANTHPMHFT_GEN_ALL_CORE
Cardiac: Positive for HTN. Denies HLD. MI/Angina/Heart Failure, Arrhythmia, Murmur/Valvular Disorder. >4 METS  Pulmonary: Positive for Asthma with multiple hospitalizations, last in 2012, intubated as child, inhaler use once per year.   Renal: Denies kidney dysfunction  Hepatic: Denies liver dysfunction  Gastrointestinal: Denies GERD/IBD  Endocrine: Denies DM or thyroid dysfunction  Neurologic: Denies stroke/seizure disorder  Hematologic: Denies anemia, blood clotting disorder, blood thinning medication.    PSH: Lumbar spine surgery for hematoma evacuation 2012, c section x 2, dilation and curettage.

## 2023-05-30 NOTE — BRIEF OPERATIVE NOTE - OPERATION/FINDINGS
Pneumoperitoneum obtained after veress needle insufflation at Guardado's point. Additional trocars placed along mid abdomen; left x2, right x2. Liver retractor placed. Robot docked. Cardia dissected to left abelino. Short gastrics ligated along greater curvature to 5cm from pylorus. Stomach divided over 38F bougie. Staple line reinforced with running V-lock. Abdomen inspected and hemostatic. Rubia applied along staple line. Robot undocked. Liver retractor removed.   Specimen removed from abdomen. Fascia closed with simple vicryl using alexandra dietrich. Skin closed with simple Monocryl.

## 2023-05-30 NOTE — PRE-ANESTHESIA EVALUATION ADULT - NSDENTALSD_ENT_ALL_CORE
Missing all upper teeth. Missing all lower molars. Lower front teeth with poor dentition./missing teeth

## 2023-05-31 ENCOUNTER — TRANSCRIPTION ENCOUNTER (OUTPATIENT)
Age: 38
End: 2023-05-31

## 2023-05-31 LAB
ANION GAP SERPL CALC-SCNC: 10 MMOL/L — SIGNIFICANT CHANGE UP (ref 5–17)
BUN SERPL-MCNC: 7 MG/DL — SIGNIFICANT CHANGE UP (ref 7–23)
CALCIUM SERPL-MCNC: 8.9 MG/DL — SIGNIFICANT CHANGE UP (ref 8.4–10.5)
CHLORIDE SERPL-SCNC: 101 MMOL/L — SIGNIFICANT CHANGE UP (ref 96–108)
CO2 SERPL-SCNC: 25 MMOL/L — SIGNIFICANT CHANGE UP (ref 22–31)
CREAT SERPL-MCNC: 0.61 MG/DL — SIGNIFICANT CHANGE UP (ref 0.5–1.3)
EGFR: 117 ML/MIN/1.73M2 — SIGNIFICANT CHANGE UP
GLUCOSE SERPL-MCNC: 124 MG/DL — HIGH (ref 70–99)
HCT VFR BLD CALC: 38.3 % — SIGNIFICANT CHANGE UP (ref 34.5–45)
HGB BLD-MCNC: 12.5 G/DL — SIGNIFICANT CHANGE UP (ref 11.5–15.5)
MAGNESIUM SERPL-MCNC: 1.8 MG/DL — SIGNIFICANT CHANGE UP (ref 1.6–2.6)
MCHC RBC-ENTMCNC: 28.1 PG — SIGNIFICANT CHANGE UP (ref 27–34)
MCHC RBC-ENTMCNC: 32.6 GM/DL — SIGNIFICANT CHANGE UP (ref 32–36)
MCV RBC AUTO: 86.1 FL — SIGNIFICANT CHANGE UP (ref 80–100)
NRBC # BLD: 0 /100 WBCS — SIGNIFICANT CHANGE UP (ref 0–0)
PHOSPHATE SERPL-MCNC: 3.2 MG/DL — SIGNIFICANT CHANGE UP (ref 2.5–4.5)
PLATELET # BLD AUTO: 318 K/UL — SIGNIFICANT CHANGE UP (ref 150–400)
POTASSIUM SERPL-MCNC: 4 MMOL/L — SIGNIFICANT CHANGE UP (ref 3.5–5.3)
POTASSIUM SERPL-SCNC: 4 MMOL/L — SIGNIFICANT CHANGE UP (ref 3.5–5.3)
RBC # BLD: 4.45 M/UL — SIGNIFICANT CHANGE UP (ref 3.8–5.2)
RBC # FLD: 12.1 % — SIGNIFICANT CHANGE UP (ref 10.3–14.5)
SODIUM SERPL-SCNC: 136 MMOL/L — SIGNIFICANT CHANGE UP (ref 135–145)
WBC # BLD: 11.37 K/UL — HIGH (ref 3.8–10.5)
WBC # FLD AUTO: 11.37 K/UL — HIGH (ref 3.8–10.5)

## 2023-05-31 RX ORDER — HYDROMORPHONE HYDROCHLORIDE 2 MG/ML
0.25 INJECTION INTRAMUSCULAR; INTRAVENOUS; SUBCUTANEOUS ONCE
Refills: 0 | Status: DISCONTINUED | OUTPATIENT
Start: 2023-05-31 | End: 2023-05-31

## 2023-05-31 RX ORDER — MAGNESIUM SULFATE 500 MG/ML
2 VIAL (ML) INJECTION ONCE
Refills: 0 | Status: COMPLETED | OUTPATIENT
Start: 2023-05-31 | End: 2023-05-31

## 2023-05-31 RX ORDER — ENOXAPARIN SODIUM 100 MG/ML
40 INJECTION SUBCUTANEOUS EVERY 12 HOURS
Refills: 0 | Status: DISCONTINUED | OUTPATIENT
Start: 2023-05-31 | End: 2023-06-02

## 2023-05-31 RX ORDER — SODIUM CHLORIDE 9 MG/ML
1000 INJECTION, SOLUTION INTRAVENOUS
Refills: 0 | Status: DISCONTINUED | OUTPATIENT
Start: 2023-05-31 | End: 2023-06-01

## 2023-05-31 RX ORDER — LANOLIN ALCOHOL/MO/W.PET/CERES
5 CREAM (GRAM) TOPICAL ONCE
Refills: 0 | Status: COMPLETED | OUTPATIENT
Start: 2023-05-31 | End: 2023-05-31

## 2023-05-31 RX ORDER — ACETAMINOPHEN 500 MG
1000 TABLET ORAL ONCE
Refills: 0 | Status: COMPLETED | OUTPATIENT
Start: 2023-05-31 | End: 2023-05-31

## 2023-05-31 RX ORDER — LIDOCAINE 4 G/100G
1 CREAM TOPICAL DAILY
Refills: 0 | Status: DISCONTINUED | OUTPATIENT
Start: 2023-05-31 | End: 2023-06-02

## 2023-05-31 RX ADMIN — ENOXAPARIN SODIUM 40 MILLIGRAM(S): 100 INJECTION SUBCUTANEOUS at 21:17

## 2023-05-31 RX ADMIN — ENOXAPARIN SODIUM 40 MILLIGRAM(S): 100 INJECTION SUBCUTANEOUS at 09:36

## 2023-05-31 RX ADMIN — ONDANSETRON 4 MILLIGRAM(S): 8 TABLET, FILM COATED ORAL at 21:17

## 2023-05-31 RX ADMIN — HYDROMORPHONE HYDROCHLORIDE 0.25 MILLIGRAM(S): 2 INJECTION INTRAMUSCULAR; INTRAVENOUS; SUBCUTANEOUS at 03:05

## 2023-05-31 RX ADMIN — Medication 105 MILLIGRAM(S): at 12:33

## 2023-05-31 RX ADMIN — ONDANSETRON 4 MILLIGRAM(S): 8 TABLET, FILM COATED ORAL at 14:54

## 2023-05-31 RX ADMIN — HYDROMORPHONE HYDROCHLORIDE 0.25 MILLIGRAM(S): 2 INJECTION INTRAMUSCULAR; INTRAVENOUS; SUBCUTANEOUS at 18:23

## 2023-05-31 RX ADMIN — SCOPALAMINE 1 PATCH: 1 PATCH, EXTENDED RELEASE TRANSDERMAL at 20:27

## 2023-05-31 RX ADMIN — Medication 400 MILLIGRAM(S): at 14:20

## 2023-05-31 RX ADMIN — PANTOPRAZOLE SODIUM 40 MILLIGRAM(S): 20 TABLET, DELAYED RELEASE ORAL at 12:32

## 2023-05-31 RX ADMIN — ONDANSETRON 4 MILLIGRAM(S): 8 TABLET, FILM COATED ORAL at 08:28

## 2023-05-31 RX ADMIN — Medication 5 MILLIGRAM(S): at 22:45

## 2023-05-31 RX ADMIN — Medication 0.12 MILLIGRAM(S): at 17:33

## 2023-05-31 RX ADMIN — SODIUM CHLORIDE 75 MILLILITER(S): 9 INJECTION, SOLUTION INTRAVENOUS at 14:55

## 2023-05-31 RX ADMIN — HYDROMORPHONE HYDROCHLORIDE 0.25 MILLIGRAM(S): 2 INJECTION INTRAMUSCULAR; INTRAVENOUS; SUBCUTANEOUS at 10:02

## 2023-05-31 RX ADMIN — HYDROMORPHONE HYDROCHLORIDE 0.25 MILLIGRAM(S): 2 INJECTION INTRAMUSCULAR; INTRAVENOUS; SUBCUTANEOUS at 18:50

## 2023-05-31 RX ADMIN — Medication 650 MILLIGRAM(S): at 00:23

## 2023-05-31 RX ADMIN — HYDROMORPHONE HYDROCHLORIDE 0.25 MILLIGRAM(S): 2 INJECTION INTRAMUSCULAR; INTRAVENOUS; SUBCUTANEOUS at 02:49

## 2023-05-31 RX ADMIN — Medication 0.12 MILLIGRAM(S): at 10:33

## 2023-05-31 RX ADMIN — SCOPALAMINE 1 PATCH: 1 PATCH, EXTENDED RELEASE TRANSDERMAL at 07:28

## 2023-05-31 RX ADMIN — SODIUM CHLORIDE 75 MILLILITER(S): 9 INJECTION, SOLUTION INTRAVENOUS at 09:36

## 2023-05-31 RX ADMIN — Medication 25 GRAM(S): at 09:36

## 2023-05-31 RX ADMIN — LIDOCAINE 1 PATCH: 4 CREAM TOPICAL at 21:16

## 2023-05-31 RX ADMIN — Medication 650 MILLIGRAM(S): at 21:17

## 2023-05-31 RX ADMIN — HYDROMORPHONE HYDROCHLORIDE 0.25 MILLIGRAM(S): 2 INJECTION INTRAMUSCULAR; INTRAVENOUS; SUBCUTANEOUS at 09:36

## 2023-05-31 RX ADMIN — Medication 0.12 MILLIGRAM(S): at 21:17

## 2023-05-31 RX ADMIN — Medication 0.12 MILLIGRAM(S): at 04:10

## 2023-05-31 NOTE — DIETITIAN INITIAL EVALUATION ADULT - WEIGHT FOR BMI (KG)
Plan of care discussed with patient. Questions allowed and answered. Patient verbalizes understanding. Patient reports surgical pain tolerable. VSS. No voiced concerns/needs at this time. Family has been updated. Will continue to monitor.     134

## 2023-05-31 NOTE — DIETITIAN INITIAL EVALUATION ADULT - COLLABORATION WITH OTHER PROVIDERS
RD has collaborated with team in regards to diet/EN/PN recs, ONS/nourishment recs, pt's overall nutritional status.

## 2023-05-31 NOTE — DIETITIAN INITIAL EVALUATION ADULT - OTHER INFO
37F with PMHx of MO (BMI 46), asthma, HTN, and psh of  section and back surgery who presents for elective weigthloss procedure. s/p LSG .      Pt seen on  at bedside. On assessment, pt resting in bed. Currently on BARICLLIQ diet, tolerating MINIMAL PO, pt's RN and medical team is aware, RD and medical team to continue to monitor. Pt had minimal to zero sips of water out of the clear, 30cc cups. Pain and nausea somewhat controlled. Discussed volumes of various cup sizes on tray table and encouraged aiming for 4 oz/hr as tolerated. Prepared with protein shakes, with plan to get vitamins after discharge. RD provided indepth edu on diet advancement and specific nutrient needs s/p LSG. NKFA. No dietary restrictions at home. Skin: surgical incisions. GI: WDL per flowsheet. Labs reviewed: elevated serum Glucose (124); will monitor trends. Pt's wt on admission was 295.5#, pt's IBW is 135#, pt is 219% of IBW; IBW to be utilized for nutrient calculations. RD observed pt with no overt signs of muscle or fat wasting. Based on ASPEN guidelines, pt does not meet criteria for malnutrition at this time. RD to follow up per protocol.

## 2023-05-31 NOTE — DIETITIAN INITIAL EVALUATION ADULT - PERTINENT LABORATORY DATA
05-31    136  |  101  |  7   ----------------------------<  124<H>  4.0   |  25  |  0.61    Ca    8.9      31 May 2023 05:30  Phos  3.2     05-31  Mg     1.8     05-31

## 2023-05-31 NOTE — DIETITIAN INITIAL EVALUATION ADULT - PERTINENT MEDS FT
MEDICATIONS  (STANDING):  acetaminophen   IVPB .. 1000 milliGRAM(s) IV Intermittent once  acetaminophen   Oral Liquid .. 650 milliGRAM(s) Oral every 6 hours  enoxaparin Injectable 40 milliGRAM(s) SubCutaneous every 12 hours  hyoscyamine SL 0.125 milliGRAM(s) SubLingual every 6 hours  lactated ringers. 1000 milliLiter(s) (75 mL/Hr) IV Continuous <Continuous>  pantoprazole  Injectable 40 milliGRAM(s) IV Push daily  thiamine IVPB 500 milliGRAM(s) IV Intermittent daily    MEDICATIONS  (PRN):  albuterol    90 MICROgram(s) HFA Inhaler 2 Puff(s) Inhalation every 6 hours PRN Shortness of Breath and/or Wheezing  ondansetron Injectable 4 milliGRAM(s) IV Push every 6 hours PRN Nausea and/or Vomiting

## 2023-05-31 NOTE — DIETITIAN INITIAL EVALUATION ADULT - PERSON TAUGHT/METHOD
RD Discussed volumes of various cup sizes on tray table and encouraged aiming for 4 oz/hr as tolerated. Pt is prepared with protein shakes, with plan to get vitamins after discharge. RD provided indepth edu on diet advancement and specific nutrient needs s/p LSG. Pt appears receptive, verbalized understanding. Written nutrition education handouts provided./verbal instruction/written material/patient instructed

## 2023-05-31 NOTE — DIETITIAN INITIAL EVALUATION ADULT - OTHER CALCULATIONS
Above energy needs calculated for wt maintenance (20-25kcal/kg IBW).   Weeks 1-2 estimated needs: 614-736kcal/day (10-12kcal/kg IBW), 92-129g pro/day (1.5-2.1g/kg IBW), >/=64oz clear fluids.

## 2023-05-31 NOTE — DISCHARGE NOTE PROVIDER - NSDCCPCAREPLAN_GEN_ALL_CORE_FT
PRINCIPAL DISCHARGE DIAGNOSIS  Diagnosis: Morbid obesity  Assessment and Plan of Treatment:      PRINCIPAL DISCHARGE DIAGNOSIS  Diagnosis: Morbid obesity  Assessment and Plan of Treatment:       SECONDARY DISCHARGE DIAGNOSES  Diagnosis: Hypertension  Assessment and Plan of Treatment:     Diagnosis: Asthma  Assessment and Plan of Treatment:

## 2023-05-31 NOTE — DISCHARGE NOTE PROVIDER - DETAILS OF MALNUTRITION DIAGNOSIS/DIAGNOSES
This patient has been assessed with a concern for Malnutrition and was treated during this hospitalization for the following Nutrition diagnosis/diagnoses:     -  05/31/2023: Morbid obesity (BMI > 40)

## 2023-05-31 NOTE — DISCHARGE NOTE PROVIDER - NSDCFUADDAPPT_GEN_ALL_CORE_FT
Therapy followup referrals if interested:    [facility]  [email or web address]  [address]  [place], [place] [address]   [phone] ([phone] for intakes)  Medicare, Medicaid, most private insurance (including [employer])    [facility]  [email or web address]  [address] [address] [phone]  Medicare, Medicaid, [employer]etna, [employer], [employer], BCBS, [facility], [place], [employer], Healthfirst, HealthPlus, [facility], [facility]  [email or web address]  [address], [place] [address] [phone]  Intake hours for new patients: Tuesdays and Thursdays at 8am  Medicare, Medicaid, most private insurance

## 2023-05-31 NOTE — DISCHARGE NOTE PROVIDER - NSDCMRMEDTOKEN_GEN_ALL_CORE_FT
Albuterol (Eqv-ProAir HFA) 90 mcg/inh inhalation aerosol: 2 inhaled prn  gabapentin 300 mg oral tablet: orally 2 times a day   Albuterol (Eqv-ProAir HFA) 90 mcg/inh inhalation aerosol: 2 inhaled prn  aspirin 325 mg oral tablet: 2 tab(s) orally every 6 hours as needed for  severe pain Please take the pill crushed MDD: 4000mg  gabapentin 300 mg oral tablet: orally 2 times a day  hyoscyamine 0.125 mg sublingual tablet: 1 tab(s) sublingually every 6 hours  Lovenox 40 mg/0.4 mL injectable solution: 40 milligram(s) subcutaneously once a day please self inject once a day for 14 days starting from day after discharge MDD: 1 injection a day  omeprazole 40 mg oral delayed release capsule: 1 cap(s) orally once a day Please open capsule and take with liquids like apple sauce or yogurt MDD: 1 tablet  ondansetron 4 mg oral tablet: 1 tab(s) orally every 6 hours as needed for  nausea/vomitting Please crush pill before taking   acetaminophen 325 mg oral tablet: 2 tab(s) orally every 6 hours please crush before taking MDD: 4000mg  Albuterol (Eqv-ProAir HFA) 90 mcg/inh inhalation aerosol: 2 inhaled prn  aspirin 325 mg oral tablet: 2 tab(s) orally every 6 hours as needed for  severe pain Please take the pill crushed MDD: 4000mg  gabapentin 300 mg oral tablet: orally 2 times a day  hyoscyamine 0.125 mg sublingual tablet: 1 tab(s) sublingually every 6 hours  Lovenox 40 mg/0.4 mL injectable solution: 40 milligram(s) subcutaneously once a day please self inject once a day for 14 days starting from day after discharge MDD: 1 injection a day  omeprazole 40 mg oral delayed release capsule: 1 cap(s) orally once a day Please open capsule and take with liquids like apple sauce or yogurt MDD: 1 tablet  ondansetron 4 mg oral tablet: 1 tab(s) orally every 6 hours as needed for  nausea/vomitting Please crush pill before taking  Oxaydo 5 mg oral tablet: 1 tab(s) orally every 6 hours as needed for  severe pain Please crush before taking MDD: 4 tablets

## 2023-05-31 NOTE — DIETITIAN NUTRITION RISK NOTIFICATION - ADDITIONAL COMMENTS/DIETITIAN RECOMMENDATIONS
1. BARICLLIQ diet   2. Recommend advance to phase 1 bariatric full liquid diet when medically feasible   3. Encourage adequate hydration with goal of 4oz/hr and/or 64 oz/day   4. Monitor BMP, BG, POCT, lytes, replete prn

## 2023-05-31 NOTE — DISCHARGE NOTE PROVIDER - NSDCFUADDINST_GEN_ALL_CORE_FT
Please follow up with Dr. Joyce in the office in 1-2 weeks. Call the office to schedule an appointment. [phone]    You may shower; soap and water over incision sites. Do not scrub. Pat dry when done. No tub bathing or swimming until cleared. Keep incision sites out of the sun as scars will darken. No heavy lifting (>10lbs) or strenuous exercise. Diet: Bariatric Full Fluids. 60 grams protein daily.  64 fluid ounces water daily. Drink small sips throughout the day. Continue diet as outlined by paperwork received as a pre-operative patient. You should be urinating at least 3-4x per day. Call the office if you experience increasing abdominal pain, nausea, vomiting, or temperature >100.4F. Avoid alcoholic beverages until cleared by Dr. Joyce.  1) Please take omeprazole capsule 40mg once a day. Make sure to open the capsule and take the contents without swallowing the whole pill  2) Please take levsin/hyoscyamine 0.125 mg sublingual 4 times a day  3) Continue with lovenox subcutaneous injection once per day for 2 weeks.  4) Please take zofran 4mg every 6 hours as needed for nausea/emesis  5) Please take Tylenol 650 mg every 4 to 6 hours by mouth for moderate pain control. Please do not exceed over 4,000 mg of Tylenol a day.   Please follow up with Dr. Joyce in the office in 1-2 weeks. Call the office to schedule an appointment. [phone]    You may shower; soap and water over incision sites. Do not scrub. Pat dry when done. No tub bathing or swimming until cleared. Keep incision sites out of the sun as scars will darken. No heavy lifting (>10lbs) or strenuous exercise. Diet: Bariatric Full Fluids. 60 grams protein daily.  64 fluid ounces water daily. Drink small sips throughout the day. Continue diet as outlined by paperwork received as a pre-operative patient. You should be urinating at least 3-4x per day. Call the office if you experience increasing abdominal pain, nausea, vomiting, or temperature >100.4F. Avoid alcoholic beverages until cleared by Dr. Joyce.  1) Please take omeprazole capsule 40mg once a day. Make sure to open the capsule and take the contents without swallowing the whole pill  2) Please take levsin/hyoscyamine 0.125 mg sublingual 4 times a day  3) Continue with lovenox subcutaneous injection once per day for 2 weeks.  4) Please take zofran 4mg every 6 hours as needed for nausea/emesis  5) Please take Tylenol 650 mg every 4 to 6 hours by mouth for moderate pain control. Please do not exceed over 4,000 mg of Tylenol a day.  6) Please self inject 40mg of lovenox every day starting the day after discharge for 14 days.    Please follow up with Dr. Joyce in the office in 1-2 weeks. Call the office to schedule an appointment. [phone]    You may shower; soap and water over incision sites. Do not scrub. Pat dry when done. No tub bathing or swimming until cleared. Keep incision sites out of the sun as scars will darken. No heavy lifting (>10lbs) or strenuous exercise. Diet: Bariatric Full Fluids. 60 grams protein daily.  64 fluid ounces water daily. Drink small sips throughout the day. Continue diet as outlined by paperwork received as a pre-operative patient. You should be urinating at least 3-4x per day. Call the office if you experience increasing abdominal pain, nausea, vomiting, or temperature >100.4F. Avoid alcoholic beverages until cleared by Dr. Joyce.  1) Please take omeprazole capsule 40mg once a day. Make sure to open the capsule and take the contents without swallowing the whole pill  2) Please take levsin/hyoscyamine 0.125 mg sublingual 4 times a day  3) Continue with lovenox subcutaneous injection once per day for 2 weeks.  4) Please take zofran 4mg every 6 hours as needed for nausea/emesis  5) Please take Tylenol 650 mg every 4 to 6 hours by mouth for moderate pain control. Please do not exceed over 4,000 mg of Tylenol a day.  6) Please self inject 40mg of lovenox every day starting the day after discharge for 14 days.   7) For severe pain please take 5mg of oxycodone every 6 hours as needed. Do not exceed over 4 tablets per day.

## 2023-05-31 NOTE — DISCHARGE NOTE PROVIDER - NSDCCPTREATMENT_GEN_ALL_CORE_FT
PRINCIPAL PROCEDURE  Procedure: Robot-assisted laparoscopic sleeve gastrectomy  Findings and Treatment:

## 2023-05-31 NOTE — DISCHARGE NOTE PROVIDER - CARE PROVIDER_API CALL
Gi Joyce  Surgery  [address], [address]  [place] [address]  Phone: [phone]  Fax: [phone]  Follow Up Time: 1 week

## 2023-05-31 NOTE — DISCHARGE NOTE PROVIDER - HOSPITAL COURSE
37F with PMHx of MO (BMI 46), asthma, HTN, and psh of  section and back surgery who presents for elective weigthloss procedure. s/p LSG .  Procedure was uncomplicated and post-op course was uneventful. Diet was advanced to bariatric clear liquids. Started on subq lovenox. At time of discharge the patient was tolerating an adequate amount of po intake and was stable and ready for discharge with follow-up as outpatient.   37F with past medical history of morbid obesity (BMI 46), asthma, hypertension, and past surgical history of  section and back surgery who presents for elective weight loss procedure. Pt had a robotic assisted laparoscopic sleeve gastrectomy on .  Procedure was uncomplicated and post-op course was uneventful. Diet was advanced to bariatric clear liquids. Started on subq lovenox. At time of discharge the patient was tolerating an adequate amount of po intake and was stable and ready for discharge with follow-up as outpatient.

## 2023-05-31 NOTE — DISCHARGE NOTE PROVIDER - ATTENDING ATTESTATION STATEMENT
[facility]  Progress Note    SUBJECTIVE:     Chief Complaint   Patient presents with    Rash     Right Hand. Started 3 weeks ago        HPI   Taty Max  is a 62 y.o. female here for c/o a rash on her right hand. Started 3 weeks ago and is not getting better. Tried multiple otc meds/ointments/lotions without help. Onset was sudden. Not spreading much, only staying on back of hand. No recent falls, scratches. It is itchy. Patient has no other new complaints/problems at this time.      Review of patient's allergies indicates:   Allergen Reactions    Oxaliplatin Hives    Factive [gemifloxacin] Hives    Statins-hmg-coa reductase inhibitors      Memory w lipitor, muscles for 2 others    Daypro [oxaprozin] Rash    Latex Hives     Adhesives       Past Medical History:   Diagnosis Date    Allergic rhinitis, seasonal 1/28/2013    Anxiety disorder 1/28/2013    Basal cell carcinoma     Bilateral retinal lattice degeneration     Cataract     Colon cancer 2008    s/p resection    DDD (degenerative disc disease), lumbar 11/25/2014    Diabetes mellitus type II, uncontrolled 7/25/2014    High myopia     History of colon cancer 4/10/2019    History of incisional hernia repair     Horseshoe retinal tear of both eyes     HTN (hypertension) 1/28/2013    Hypertension     Hypothyroidism 4/29/2014    Incisional hernia of anterior abdominal wall without obstruction or gangrene     Lumbar disc disease 7/25/2014    Metabolic syndrome 4/29/2014    Neuropathy due to chemotherapeutic drug 5/22/2017    Osteopenia 5/10/2013    Screening for colorectal cancer 9/11/2015    Normal 6/ 2015---5 yrs    Vitamin D deficiency disease     Vitreous detachment of both eyes      Past Surgical History:   Procedure Laterality Date    ADENOIDECTOMY      ANKLE SURGERY      left     BREAST LUMPECTOMY      CATARACT EXTRACTION      CATARACT EXTRACTION BILATERAL W/ ANTERIOR VITRECTOMY      COLECTOMY      s/p  "reanastemosis    COLON SURGERY      EYE EXAMINATION UNDER ANESTHESIA W/ RETINAL CRYOTHERAPY AND RETINAL LASER      HERNIA REPAIR      KIDNEY SURGERY      "dilate urethra tube"    NASAL POLYP EXCISION      NASAL SEPTUM SURGERY      RADIOFREQUENCY ABLATION OF LUMBAR MEDIAL BRANCH NERVE AT SINGLE LEVEL Right 2018    Procedure: RADIOFREQUENCY ABLATION, NERVE, MEDIAL BRANCH, LUMBAR, 1 LEVEL;  Surgeon: Vashti Poe MD;  Location: [facility];  Service: Pain Management;  Laterality: Right;  RIght Thermal RFA @ L3-5  (REPEAT)  [id number]  with IV Sedation    CONSENT NEEDED    s/p laser ou      TONSILLECTOMY      TUBAL LIGATION      UVULOPALATOPHARYNGOPLASTY  1990s     Family History   Problem Relation Age of Onset    Cancer Maternal Grandmother     Hyperlipidemia Mother     Hypertension Mother     Breast cancer Mother     Allergies Father     Allergies Sister     Allergies Brother     Heart disease Maternal Grandfather     Stroke Paternal Grandmother     Colon cancer Neg Hx     Ovarian cancer Neg Hx      Social History     Tobacco Use    Smoking status: Former Smoker     Last attempt to quit: 1977     Years since quittin.4    Smokeless tobacco: Never Used   Substance Use Topics    Alcohol use: Yes     Frequency: Monthly or less     Drinks per session: 1 or 2     Binge frequency: Never     Comment: once per week    Drug use: No        Review of Systems   Constitutional: Negative for chills and fever.   Respiratory: Negative for sputum production.    Cardiovascular: Negative for chest pain.   Musculoskeletal: Negative for joint pain.   Skin: Positive for itching and rash.     OBJECTIVE:     Vitals:    20 1039   BP: 118/64   Pulse: 93   Resp: 18   Temp: 98.3 °F (36.8 °C)     Body mass index is 42.8 kg/m².    Physical Exam   Constitutional: She is oriented to person, place, and time and well-developed, well-nourished, and in no distress. No distress.   HENT:   Head: " Normocephalic and atraumatic.   Eyes: Conjunctivae and EOM are normal.   Pulmonary/Chest: Effort normal.   Neurological: She is alert and oriented to person, place, and time.   Skin: Rash (dry, scaly, slghtly erythemic rash patch on R dorsal hand. no discharge, abscess.) noted. She is not diaphoretic.       Old records were reviewed. Labs and/or images were independently reviewed.    ASSESSMENT     1. Dermatitis        PLAN:     Dermatitis  -     Start triamcinolone (KENALOG) 0.5 % ointment; Apply topically 2 (two) times daily.  Dispense: 15 g; Refill: 1  -     Ok to use aquaphor to help. Will try a more potent topical steroid if this doesn't help.       RTC PRN    Reginald Way MD  01/03/2020 11:02 AM       I have personally seen and examined the patient. I have collaborated with and supervised the

## 2023-05-31 NOTE — PROGRESS NOTE ADULT - SUBJECTIVE AND OBJECTIVE BOX
INCOMPLETE NOTE    INTERVAL HPI/OVERNIGHT EVENTS:  Post op Hgb 12.2 (12.8), passed TOV, , urinated 350,   5/30: s/p LSG     STATUS POST:  Robotic assisted lap sleeve gastrectomy    POST OPERATIVE DAY #: 1    SUBJECTIVE:          Vital Signs Last 24 Hrs  T(C): 37.1 (31 May 2023 05:14), Max: 37.1 (30 May 2023 18:42)  T(F): 98.7 (31 May 2023 05:14), Max: 98.8 (30 May 2023 20:30)  HR: 74 (31 May 2023 05:14) (66 - 76)  BP: 125/74 (31 May 2023 05:14) (123/80 - 160/74)  BP(mean): 94 (30 May 2023 18:15) (94 - 111)  RR: 18 (31 May 2023 05:14) (14 - 18)  SpO2: 98% (31 May 2023 05:14) (95% - 98%)    Parameters below as of 31 May 2023 05:14  Patient On (Oxygen Delivery Method): room air      I&O's Detail    30 May 2023 07:01  -  31 May 2023 06:20  --------------------------------------------------------  IN:    Lactated Ringers: 2275 mL  Total IN: 2275 mL    OUT:    Voided (mL): 1050 mL  Total OUT: 1050 mL    Total NET: 1225 mL          General: NAD, resting comfortably in bed  C/V: NSR  Pulm: Nonlabored breathing, no respiratory distress  Abd:  Soft, non-distended, TTP around incision site, incision clean dry and intact.   Extrem: WWP, no edema, SCDs in place        LABS:                        12.2   12.23 )-----------( 287      ( 30 May 2023 22:22 )             37.7                    INTERVAL HPI/OVERNIGHT EVENTS:  Post op Hgb 12.2 (12.8), passed TOV, , urinated 350,   5/30: s/p LSG     STATUS POST:  Robotic assisted lap sleeve gastrectomy    POST OPERATIVE DAY #: 1    SUBJECTIVE: Patient examined bedside with chief resident. Patient observed resting comfortably and not in distress.   Patient complains of incisional pain. She reports that she is not tolerating bariatric clear liquid diet. She reports she is ambulating with assistance and using incentive spirometer.  Patient denies SOB, chest pain, nausea and emesis. Patient making adequate urine output.            Vital Signs Last 24 Hrs  T(C): 37.1 (31 May 2023 05:14), Max: 37.1 (30 May 2023 18:42)  T(F): 98.7 (31 May 2023 05:14), Max: 98.8 (30 May 2023 20:30)  HR: 74 (31 May 2023 05:14) (66 - 76)  BP: 125/74 (31 May 2023 05:14) (123/80 - 160/74)  BP(mean): 94 (30 May 2023 18:15) (94 - 111)  RR: 18 (31 May 2023 05:14) (14 - 18)  SpO2: 98% (31 May 2023 05:14) (95% - 98%)    Parameters below as of 31 May 2023 05:14  Patient On (Oxygen Delivery Method): room air      I&O's Detail    30 May 2023 07:01  -  31 May 2023 06:20  --------------------------------------------------------  IN:    Lactated Ringers: 2275 mL  Total IN: 2275 mL    OUT:    Voided (mL): 1050 mL  Total OUT: 1050 mL    Total NET: 1225 mL          General: NAD, resting comfortably in bed  C/V: NSR  Pulm: Nonlabored breathing, no respiratory distress  Abd:  Soft, non-distended, TTP around incision site, incision clean dry and intact.   Extrem: WWP, no edema, SCDs in place        LABS:                        12.2   12.23 )-----------( 287      ( 30 May 2023 22:22 )             37.7

## 2023-05-31 NOTE — DISCHARGE NOTE PROVIDER - NSDCFUSCHEDAPPT_GEN_ALL_CORE_FT
Gi Joyce  [facility]  [facility] [address]  Scheduled Appointment: 06/14/2023

## 2023-06-01 LAB
ANION GAP SERPL CALC-SCNC: 10 MMOL/L — SIGNIFICANT CHANGE UP (ref 5–17)
BUN SERPL-MCNC: 7 MG/DL — SIGNIFICANT CHANGE UP (ref 7–23)
CALCIUM SERPL-MCNC: 8 MG/DL — LOW (ref 8.4–10.5)
CHLORIDE SERPL-SCNC: 101 MMOL/L — SIGNIFICANT CHANGE UP (ref 96–108)
CO2 SERPL-SCNC: 28 MMOL/L — SIGNIFICANT CHANGE UP (ref 22–31)
CREAT SERPL-MCNC: 0.69 MG/DL — SIGNIFICANT CHANGE UP (ref 0.5–1.3)
EGFR: 114 ML/MIN/1.73M2 — SIGNIFICANT CHANGE UP
GLUCOSE SERPL-MCNC: 101 MG/DL — HIGH (ref 70–99)
HCT VFR BLD CALC: 35.7 % — SIGNIFICANT CHANGE UP (ref 34.5–45)
HGB BLD-MCNC: 11.4 G/DL — LOW (ref 11.5–15.5)
MAGNESIUM SERPL-MCNC: 1.9 MG/DL — SIGNIFICANT CHANGE UP (ref 1.6–2.6)
MCHC RBC-ENTMCNC: 27.9 PG — SIGNIFICANT CHANGE UP (ref 27–34)
MCHC RBC-ENTMCNC: 31.9 GM/DL — LOW (ref 32–36)
MCV RBC AUTO: 87.5 FL — SIGNIFICANT CHANGE UP (ref 80–100)
NRBC # BLD: 0 /100 WBCS — SIGNIFICANT CHANGE UP (ref 0–0)
PHOSPHATE SERPL-MCNC: 3.1 MG/DL — SIGNIFICANT CHANGE UP (ref 2.5–4.5)
PLATELET # BLD AUTO: 230 K/UL — SIGNIFICANT CHANGE UP (ref 150–400)
POTASSIUM SERPL-MCNC: 3.6 MMOL/L — SIGNIFICANT CHANGE UP (ref 3.5–5.3)
POTASSIUM SERPL-SCNC: 3.6 MMOL/L — SIGNIFICANT CHANGE UP (ref 3.5–5.3)
RBC # BLD: 4.08 M/UL — SIGNIFICANT CHANGE UP (ref 3.8–5.2)
RBC # FLD: 12.3 % — SIGNIFICANT CHANGE UP (ref 10.3–14.5)
SODIUM SERPL-SCNC: 139 MMOL/L — SIGNIFICANT CHANGE UP (ref 135–145)
WBC # BLD: 9.22 K/UL — SIGNIFICANT CHANGE UP (ref 3.8–10.5)
WBC # FLD AUTO: 9.22 K/UL — SIGNIFICANT CHANGE UP (ref 3.8–10.5)

## 2023-06-01 PROCEDURE — ZZZZZ: CPT

## 2023-06-01 RX ORDER — POTASSIUM CHLORIDE 20 MEQ
20 PACKET (EA) ORAL ONCE
Refills: 0 | Status: DISCONTINUED | OUTPATIENT
Start: 2023-06-01 | End: 2023-06-01

## 2023-06-01 RX ORDER — POTASSIUM CHLORIDE 20 MEQ
10 PACKET (EA) ORAL
Refills: 0 | Status: COMPLETED | OUTPATIENT
Start: 2023-06-01 | End: 2023-06-01

## 2023-06-01 RX ORDER — HYDROMORPHONE HYDROCHLORIDE 2 MG/ML
0.25 INJECTION INTRAMUSCULAR; INTRAVENOUS; SUBCUTANEOUS ONCE
Refills: 0 | Status: DISCONTINUED | OUTPATIENT
Start: 2023-06-01 | End: 2023-06-01

## 2023-06-01 RX ORDER — ACETAMINOPHEN 500 MG
650 TABLET ORAL EVERY 6 HOURS
Refills: 0 | Status: DISCONTINUED | OUTPATIENT
Start: 2023-06-02 | End: 2023-06-02

## 2023-06-01 RX ORDER — SCOPALAMINE 1 MG/3D
1 PATCH, EXTENDED RELEASE TRANSDERMAL
Refills: 0 | Status: DISCONTINUED | OUTPATIENT
Start: 2023-06-01 | End: 2023-06-01

## 2023-06-01 RX ORDER — ACETAMINOPHEN 500 MG
1000 TABLET ORAL ONCE
Refills: 0 | Status: COMPLETED | OUTPATIENT
Start: 2023-06-01 | End: 2023-06-01

## 2023-06-01 RX ORDER — CYCLOBENZAPRINE HYDROCHLORIDE 10 MG/1
5 TABLET, FILM COATED ORAL EVERY 8 HOURS
Refills: 0 | Status: DISCONTINUED | OUTPATIENT
Start: 2023-06-01 | End: 2023-06-02

## 2023-06-01 RX ADMIN — Medication 0.12 MILLIGRAM(S): at 04:34

## 2023-06-01 RX ADMIN — Medication 105 MILLIGRAM(S): at 11:33

## 2023-06-01 RX ADMIN — PANTOPRAZOLE SODIUM 40 MILLIGRAM(S): 20 TABLET, DELAYED RELEASE ORAL at 11:33

## 2023-06-01 RX ADMIN — HYDROMORPHONE HYDROCHLORIDE 0.25 MILLIGRAM(S): 2 INJECTION INTRAMUSCULAR; INTRAVENOUS; SUBCUTANEOUS at 00:36

## 2023-06-01 RX ADMIN — ENOXAPARIN SODIUM 40 MILLIGRAM(S): 100 INJECTION SUBCUTANEOUS at 09:36

## 2023-06-01 RX ADMIN — Medication 100 MILLIEQUIVALENT(S): at 12:54

## 2023-06-01 RX ADMIN — Medication 0.12 MILLIGRAM(S): at 16:47

## 2023-06-01 RX ADMIN — Medication 650 MILLIGRAM(S): at 13:57

## 2023-06-01 RX ADMIN — CYCLOBENZAPRINE HYDROCHLORIDE 5 MILLIGRAM(S): 10 TABLET, FILM COATED ORAL at 22:44

## 2023-06-01 RX ADMIN — Medication 100 MILLIEQUIVALENT(S): at 09:37

## 2023-06-01 RX ADMIN — ENOXAPARIN SODIUM 40 MILLIGRAM(S): 100 INJECTION SUBCUTANEOUS at 21:47

## 2023-06-01 RX ADMIN — Medication 0.12 MILLIGRAM(S): at 21:47

## 2023-06-01 RX ADMIN — LIDOCAINE 1 PATCH: 4 CREAM TOPICAL at 21:47

## 2023-06-01 RX ADMIN — Medication 400 MILLIGRAM(S): at 20:13

## 2023-06-01 RX ADMIN — LIDOCAINE 1 PATCH: 4 CREAM TOPICAL at 07:36

## 2023-06-01 RX ADMIN — Medication 650 MILLIGRAM(S): at 07:33

## 2023-06-01 RX ADMIN — CYCLOBENZAPRINE HYDROCHLORIDE 5 MILLIGRAM(S): 10 TABLET, FILM COATED ORAL at 09:36

## 2023-06-01 RX ADMIN — HYDROMORPHONE HYDROCHLORIDE 0.25 MILLIGRAM(S): 2 INJECTION INTRAMUSCULAR; INTRAVENOUS; SUBCUTANEOUS at 01:06

## 2023-06-01 RX ADMIN — LIDOCAINE 1 PATCH: 4 CREAM TOPICAL at 11:39

## 2023-06-01 RX ADMIN — SCOPALAMINE 1 PATCH: 1 PATCH, EXTENDED RELEASE TRANSDERMAL at 18:25

## 2023-06-01 RX ADMIN — Medication 100 MILLIEQUIVALENT(S): at 18:24

## 2023-06-01 RX ADMIN — Medication 0.12 MILLIGRAM(S): at 09:36

## 2023-06-01 NOTE — PROGRESS NOTE ADULT - ASSESSMENT
37F with PMHx of MO (BMI 46), asthma, HTN, and psh of  section and back surgery who presents for elective weigthloss procedure. s/p LSG .      -BCLD/IVF  -pain/nausea control prn with zofran, tylenol, and toradol 115mg q6 12 hours   -Thiamine 500mg iv POD0 and POD1  -protonix 40mg iv qd  -hyocsyamine 0.125 mg q6h  -Nutrition consult in AM  -OOB/IS/SCD  -SQH on POD1 after AM CBC  - Give IV Robaxin  - scolopamine patch

## 2023-06-01 NOTE — BH CONSULTATION LIAISON ASSESSMENT NOTE - NSBHCHARTREVIEWVS_PSY_A_CORE FT
Vital Signs Last 24 Hrs  T(C): 36.9 (01 Jun 2023 13:44), Max: 37.2 (01 Jun 2023 05:00)  T(F): 98.4 (01 Jun 2023 13:44), Max: 98.9 (01 Jun 2023 05:00)  HR: 77 (01 Jun 2023 13:44) (62 - 77)  BP: 118/80 (01 Jun 2023 13:44) (118/80 - 144/85)  BP(mean): 102 (01 Jun 2023 05:00) (93 - 102)  RR: 17 (01 Jun 2023 13:44) (16 - 17)  SpO2: 93% (01 Jun 2023 13:44) (93% - 95%)    Parameters below as of 01 Jun 2023 13:44  Patient On (Oxygen Delivery Method): room air

## 2023-06-01 NOTE — BH CONSULTATION LIAISON ASSESSMENT NOTE - NSBHCHARTREVIEWLAB_PSY_A_CORE FT
LABS:                        12.5   11.37 )-----------( 318      ( 31 May 2023 05:30 )             38.3     05-31    136  |  101  |  7   ----------------------------<  124<H>  4.0   |  25  |  0.61    Ca    8.9      31 May 2023 05:30  Phos  3.2     05-31  Mg     1.8     05-31

## 2023-06-01 NOTE — BH CONSULTATION LIAISON ASSESSMENT NOTE - NSBHCONSULTFOLLOWAFTERCARE_PSY_A_CORE FT
No need for inpatient psych admission or further inpatient psych f/u or medications. Pt will be provided w/ list of outpatient therapists to follow up with after d/c  No need for inpatient psych admission or further inpatient psych f/u or medications.     Therapy followup referrals if interested:  •	[facility]  o	[email or web address]  o	[address]  [place], [place] [address]   [phone] ([phone] for intakes)  o	Medicare, Medicaid, most private insurance (including [employer])  •	[facility]  o	[email or web address]  o	[address] [address] [phone]  o	Medicare, Medicaid, Aetna, [employer], [employer], BCBS, [employer] [facility] [employer], [place], [employer]HI, Healthfirst, HealthPlus, MetroPlus, Wellcare  •	[facility]  o	[email or web address]  o	[address] [address] [phone]  Intake hours for new patients: Tuesdays and Thursdays at 8am  o	Medicare, Medicaid, most private insurance

## 2023-06-01 NOTE — BH CONSULTATION LIAISON ASSESSMENT NOTE - CURRENT MEDICATION
MEDICATIONS  (STANDING):  acetaminophen   Oral Liquid .. 650 milliGRAM(s) Oral every 6 hours  enoxaparin Injectable 40 milliGRAM(s) SubCutaneous every 12 hours  hyoscyamine SL 0.125 milliGRAM(s) SubLingual every 6 hours  lactated ringers. 1000 milliLiter(s) (75 mL/Hr) IV Continuous <Continuous>  lidocaine   4% Patch 1 Patch Transdermal daily  pantoprazole  Injectable 40 milliGRAM(s) IV Push daily  potassium chloride  10 mEq/100 mL IVPB 10 milliEquivalent(s) IV Intermittent every 1 hour    MEDICATIONS  (PRN):  albuterol    90 MICROgram(s) HFA Inhaler 2 Puff(s) Inhalation every 6 hours PRN Shortness of Breath and/or Wheezing  cyclobenzaprine 5 milliGRAM(s) Oral every 8 hours PRN Muscle Spasm  ondansetron Injectable 4 milliGRAM(s) IV Push every 6 hours PRN Nausea and/or Vomiting

## 2023-06-01 NOTE — PROGRESS NOTE ADULT - SUBJECTIVE AND OBJECTIVE BOX
GENERAL SURGERY PROGRESS NOTE    SUBJECTIVE: Patient seen and examined bedside. Resting comfortably, -p-[id number]-h-c-sob-nettie. Pt had some mild nausea over night which improved with lidocaine patch and dilaudid overnight.  Pt endorses being OOBA.     enoxaparin Injectable 40 milliGRAM(s) SubCutaneous every 12 hours      Vital Signs Last 24 Hrs  T(C): 37.2 (01 Jun 2023 05:00), Max: 37.2 (01 Jun 2023 05:00)  T(F): 98.9 (01 Jun 2023 05:00), Max: 98.9 (01 Jun 2023 05:00)  HR: 72 (01 Jun 2023 05:00) (62 - 72)  BP: 137/85 (01 Jun 2023 05:00) (117/69 - 140/86)  BP(mean): 102 (01 Jun 2023 05:00) (93 - 102)  RR: 17 (01 Jun 2023 05:00) (16 - 17)  SpO2: 93% (01 Jun 2023 05:00) (93% - 95%)    Parameters below as of 01 Jun 2023 05:00  Patient On (Oxygen Delivery Method): room air      I&O's Detail    31 May 2023 07:01  -  01 Jun 2023 07:00  --------------------------------------------------------  IN:    Lactated Ringers: 900 mL    Oral Fluid: 520 mL  Total IN: 1420 mL    OUT:    Voided (mL): 1350 mL  Total OUT: 1350 mL    Total NET: 70 mL      General: NAD, resting comfortably in bed  C/V: NSR  Pulm: Nonlabored breathing, no respiratory distress  Abd:  Soft, non-distended, TTP around incision site, incision clean dry and intact.   Extrem: WWP, no edema, SCDs in place        LABS:                        12.5   11.37 )-----------( 318      ( 31 May 2023 05:30 )             38.3     05-31    136  |  101  |  7   ----------------------------<  124<H>  4.0   |  25  |  0.61    Ca    8.9      31 May 2023 05:30  Phos  3.2     05-31  Mg     1.8     05-31            RADIOLOGY & ADDITIONAL STUDIES:

## 2023-06-01 NOTE — BH CONSULTATION LIAISON ASSESSMENT NOTE - NSBHREFERDETAILS_PSY_A_CORE_FT
Psych consult for med recommendations for pt s/p gastric sleeve requesting Seroquel rx prior to d/c. After interviewing pt, informed consult was placed in error for the wrong pt by pt's primary team; however, pt is interested in outpatient therapist referral Psych consult for med recommendations for pt s/p gastric sleeve requesting Seroquel rx and ADHD medication prior to d/c. After completing consult, team called again to report that this consult was placed in error

## 2023-06-01 NOTE — BH CONSULTATION LIAISON ASSESSMENT NOTE - HPI (INCLUDE ILLNESS QUALITY, SEVERITY, DURATION, TIMING, CONTEXT, MODIFYING FACTORS, ASSOCIATED SIGNS AND SYMPTOMS)
38 y/o F, , domiciled w/  & 9 children, English speaking F w/ PMHx chronic back pain(s/p fall from stairs 2010), asthma, HTN, & morbid obesity, PPHx unspecified anxiety disorder, no inpatient psych hospitalizations, post op day 2 s/p gastric sleeve. Psych CL consulted for medication management prior to d/c. Consult for this pt placed in error by pt's primary team, however on interview when asked how her moods have been recently pt reports "some anxiety" that she states is controlled but "I wouldn't mind talking to someone." She is not interested in taking any medications for anxiety at this time & feels she has appropriate coping skills. Pt reports seeing a psychiatrist & therapist 17 yrs ago after her son was dx w/ stage IV rhabdomyosarcoma, & states she was rx Lexapro, Seroquel, & Klonopin at that time. She reports once her son's health issues resolved she no longer needed these meds & has not seen a psychiatrist since. Pt reports some episodes of binge drinking & "staying out all night" around the time of her son's diagnosis but also states this was situational & stopped once her son was doing better, now drinks alcohol "only on vacation". She has been rx Ambien in the past, stating "sleep can be rough sometimes, I have 9 children", but denies taking this recently. Pt denies any SI, HI, hx NSSIB, suicide attempts, inpatient psych hospitalizations, AVH, or delusions. Denies any issues w/ substance abuse. Pt reports FHx bipolar disorder in both parents. Pt is rx Suboxone for chronic back pain which she has been taking for the past 5 yrs s/p back surgery & is followed by pain management for this. On interview, pt cooperative, calm, pleasant, A&Ox4.

## 2023-06-01 NOTE — BH CONSULTATION LIAISON ASSESSMENT NOTE - SUMMARY
38 y/o F w/ PMHx chronic back pain(s/p fall from stairs 2010), morbid obesity, HTN, asthma, PPHx unspecified anxiety disorder, no hx inpatient psych hospitalizations, post op day 2 s/p gastric sleeve. Psych consult for medication management prior to pt's d/c. Consult placed in error for this pt by primary team, however pt does report some anxiety recently that she states is controlled w/o medications. Pt was last seen by psychiatry 17 yrs ago for anxiety after her son was dx w/ stage IV rhabdomyosarcoma, & was rx Lexapro, Klonopin, & Seroquel at that time. She states she no longer needed these meds after her son's health issues resolved & has not been seen by psychiatry since. Pt states "I wouldn't mind having someone to talk to" and is interested in seeing a therapist outpatient after d/c. Pt is not interested in seeing a psychiatrist outpatient or any medications for anxiety at this time. She has been rx Suboxone for the past 5 yrs for chronic back pain & is followed by pain management for this. Pt denies any SI, HI, delusions, AVH, hx NSSIB, suicide attempts, or inpatient psych hospitalizations. On interview pt calm, A&Ox4, cooperative, & pleasant.

## 2023-06-01 NOTE — BH CONSULTATION LIAISON ASSESSMENT NOTE - RISK ASSESSMENT
Pt has low chronic and acute suicide risk. No current depressive sx, no SI, HI, AVH, delusions, no hx NSSIB or suicide attempts. Protective factors include close family relationships with  & mother, 9 children that all live with her, future plans.

## 2023-06-01 NOTE — BH CONSULTATION LIAISON ASSESSMENT NOTE - DESCRIPTION
Former 1.5 ppd smoker, started age 13, quit 2018. Pt drinks alcohol "only on vacation". Denies hx illicit drug use. Pt is  & has 9 children, ranging from 22 y/o to 1.6 y/o. She lives with  & 9 children. 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 Highest level of education: High school

## 2023-06-01 NOTE — BH CONSULTATION LIAISON ASSESSMENT NOTE - NSBHATTESTCOMMENTATTENDFT_PSY_A_CORE
Consult was placed for this pt, d/w primary team [phone] who described this case, seeking med management reccs and f/u reccs.  Pt assessed and consult completed.  Later today, primary team called that this consult was made in error as a new resident on service had mistakenly confused patients.      38yo F hx morbid obesity, asthma, HTN, POD2 s/p gastric sleeve surgery for weight loss, ready for discharge.  [Psychiatry consulted as pt reporting taking Seroquel and other psychiatric medications, previously unknown to primary team, pt seeking ADHD Rx upon discharge, team concerned pt contradicting herself in assessments.  They seek reccs for current med management and f/u.  This was later found to be inaccurate history.]      Pt assessed with PA student.  Pt alert, attentive, denies any psychiatric c/o, reported taking no psychiatric medications, describes stable mood, no uncontrolled anxiety, no current for hx SI/SA.  Pt future oriented for recovery after surgery and continued appointments.  No substance abuse, pt on suboxone for pain from pain management MD for over a decade.  Hx depression and Lexapro 17yrs ago, denies current sx or need for medication.  Pt reports 9 children, has support at home, interested in counseling to help cope after surgery but declines any interest in or need for psychiatric f/u.  No indicators of acute risk at this time.   Reference #: [id number]- buprenorphine-naloxone 8mg-2mg #30 and 12mg-3mg #60 last on 5/12/23.  -psychiatrically cleared for discharge  -no indication for new psychiatric rx at this time  -therapy f/u per pt request

## 2023-06-02 ENCOUNTER — TRANSCRIPTION ENCOUNTER (OUTPATIENT)
Age: 38
End: 2023-06-02

## 2023-06-02 VITALS
DIASTOLIC BLOOD PRESSURE: 76 MMHG | RESPIRATION RATE: 18 BRPM | OXYGEN SATURATION: 94 % | TEMPERATURE: 97 F | HEART RATE: 72 BPM | SYSTOLIC BLOOD PRESSURE: 120 MMHG

## 2023-06-02 LAB
ANION GAP SERPL CALC-SCNC: 11 MMOL/L — SIGNIFICANT CHANGE UP (ref 5–17)
BUN SERPL-MCNC: 7 MG/DL — SIGNIFICANT CHANGE UP (ref 7–23)
CALCIUM SERPL-MCNC: 8.8 MG/DL — SIGNIFICANT CHANGE UP (ref 8.4–10.5)
CHLORIDE SERPL-SCNC: 100 MMOL/L — SIGNIFICANT CHANGE UP (ref 96–108)
CO2 SERPL-SCNC: 25 MMOL/L — SIGNIFICANT CHANGE UP (ref 22–31)
CREAT SERPL-MCNC: 0.64 MG/DL — SIGNIFICANT CHANGE UP (ref 0.5–1.3)
EGFR: 116 ML/MIN/1.73M2 — SIGNIFICANT CHANGE UP
GLUCOSE SERPL-MCNC: 80 MG/DL — SIGNIFICANT CHANGE UP (ref 70–99)
HCT VFR BLD CALC: 37.1 % — SIGNIFICANT CHANGE UP (ref 34.5–45)
HGB BLD-MCNC: 11.9 G/DL — SIGNIFICANT CHANGE UP (ref 11.5–15.5)
MAGNESIUM SERPL-MCNC: 1.8 MG/DL — SIGNIFICANT CHANGE UP (ref 1.6–2.6)
MCHC RBC-ENTMCNC: 28.3 PG — SIGNIFICANT CHANGE UP (ref 27–34)
MCHC RBC-ENTMCNC: 32.1 GM/DL — SIGNIFICANT CHANGE UP (ref 32–36)
MCV RBC AUTO: 88.3 FL — SIGNIFICANT CHANGE UP (ref 80–100)
NRBC # BLD: 0 /100 WBCS — SIGNIFICANT CHANGE UP (ref 0–0)
PHOSPHATE SERPL-MCNC: 3.7 MG/DL — SIGNIFICANT CHANGE UP (ref 2.5–4.5)
PLATELET # BLD AUTO: 259 K/UL — SIGNIFICANT CHANGE UP (ref 150–400)
POTASSIUM SERPL-MCNC: 3.7 MMOL/L — SIGNIFICANT CHANGE UP (ref 3.5–5.3)
POTASSIUM SERPL-SCNC: 3.7 MMOL/L — SIGNIFICANT CHANGE UP (ref 3.5–5.3)
RBC # BLD: 4.2 M/UL — SIGNIFICANT CHANGE UP (ref 3.8–5.2)
RBC # FLD: 12 % — SIGNIFICANT CHANGE UP (ref 10.3–14.5)
SODIUM SERPL-SCNC: 136 MMOL/L — SIGNIFICANT CHANGE UP (ref 135–145)
WBC # BLD: 8.05 K/UL — SIGNIFICANT CHANGE UP (ref 3.8–10.5)
WBC # FLD AUTO: 8.05 K/UL — SIGNIFICANT CHANGE UP (ref 3.8–10.5)

## 2023-06-02 PROCEDURE — S2900: CPT

## 2023-06-02 PROCEDURE — 36415 COLL VENOUS BLD VENIPUNCTURE: CPT

## 2023-06-02 PROCEDURE — 88341 IMHCHEM/IMCYTCHM EA ADD ANTB: CPT

## 2023-06-02 PROCEDURE — 83735 ASSAY OF MAGNESIUM: CPT

## 2023-06-02 PROCEDURE — 86850 RBC ANTIBODY SCREEN: CPT

## 2023-06-02 PROCEDURE — 84100 ASSAY OF PHOSPHORUS: CPT

## 2023-06-02 PROCEDURE — 88307 TISSUE EXAM BY PATHOLOGIST: CPT

## 2023-06-02 PROCEDURE — 86900 BLOOD TYPING SEROLOGIC ABO: CPT

## 2023-06-02 PROCEDURE — 86901 BLOOD TYPING SEROLOGIC RH(D): CPT

## 2023-06-02 PROCEDURE — 85027 COMPLETE CBC AUTOMATED: CPT

## 2023-06-02 PROCEDURE — C1889: CPT

## 2023-06-02 PROCEDURE — 80048 BASIC METABOLIC PNL TOTAL CA: CPT

## 2023-06-02 RX ORDER — ENOXAPARIN SODIUM 100 MG/ML
40 INJECTION SUBCUTANEOUS
Qty: 14 | Refills: 0
Start: 2023-06-02 | End: 2023-06-15

## 2023-06-02 RX ORDER — MAGNESIUM SULFATE 500 MG/ML
1 VIAL (ML) INJECTION ONCE
Refills: 0 | Status: COMPLETED | OUTPATIENT
Start: 2023-06-02 | End: 2023-06-02

## 2023-06-02 RX ORDER — ONDANSETRON 8 MG/1
1 TABLET, FILM COATED ORAL
Qty: 60 | Refills: 0
Start: 2023-06-02 | End: 2023-06-16

## 2023-06-02 RX ORDER — ASPIRIN/CALCIUM CARB/MAGNESIUM 324 MG
2 TABLET ORAL
Qty: 112 | Refills: 0
Start: 2023-06-02 | End: 2023-06-15

## 2023-06-02 RX ORDER — HYDROMORPHONE HYDROCHLORIDE 2 MG/ML
0.2 INJECTION INTRAMUSCULAR; INTRAVENOUS; SUBCUTANEOUS ONCE
Refills: 0 | Status: DISCONTINUED | OUTPATIENT
Start: 2023-06-02 | End: 2023-06-02

## 2023-06-02 RX ORDER — OXYCODONE HYDROCHLORIDE 5 MG/1
1 TABLET ORAL
Qty: 15 | Refills: 0
Start: 2023-06-02

## 2023-06-02 RX ORDER — ACETAMINOPHEN 500 MG
2 TABLET ORAL
Qty: 112 | Refills: 0
Start: 2023-06-02 | End: 2023-06-15

## 2023-06-02 RX ORDER — HYOSCYAMINE SULFATE 0.13 MG
1 TABLET ORAL
Qty: 120 | Refills: 0
Start: 2023-06-02 | End: 2023-07-01

## 2023-06-02 RX ORDER — POTASSIUM CHLORIDE 20 MEQ
20 PACKET (EA) ORAL ONCE
Refills: 0 | Status: COMPLETED | OUTPATIENT
Start: 2023-06-02 | End: 2023-06-02

## 2023-06-02 RX ORDER — OMEPRAZOLE 10 MG/1
1 CAPSULE, DELAYED RELEASE ORAL
Qty: 30 | Refills: 0
Start: 2023-06-02 | End: 2023-07-01

## 2023-06-02 RX ADMIN — Medication 20 MILLIEQUIVALENT(S): at 09:21

## 2023-06-02 RX ADMIN — Medication 100 GRAM(S): at 09:21

## 2023-06-02 RX ADMIN — Medication 650 MILLIGRAM(S): at 06:14

## 2023-06-02 RX ADMIN — ENOXAPARIN SODIUM 40 MILLIGRAM(S): 100 INJECTION SUBCUTANEOUS at 11:36

## 2023-06-02 RX ADMIN — HYDROMORPHONE HYDROCHLORIDE 0.2 MILLIGRAM(S): 2 INJECTION INTRAMUSCULAR; INTRAVENOUS; SUBCUTANEOUS at 08:17

## 2023-06-02 RX ADMIN — HYDROMORPHONE HYDROCHLORIDE 0.2 MILLIGRAM(S): 2 INJECTION INTRAMUSCULAR; INTRAVENOUS; SUBCUTANEOUS at 08:02

## 2023-06-02 RX ADMIN — SCOPALAMINE 1 PATCH: 1 PATCH, EXTENDED RELEASE TRANSDERMAL at 06:21

## 2023-06-02 RX ADMIN — Medication 0.12 MILLIGRAM(S): at 04:53

## 2023-06-02 RX ADMIN — PANTOPRAZOLE SODIUM 40 MILLIGRAM(S): 20 TABLET, DELAYED RELEASE ORAL at 12:30

## 2023-06-02 RX ADMIN — LIDOCAINE 1 PATCH: 4 CREAM TOPICAL at 12:31

## 2023-06-02 RX ADMIN — Medication 650 MILLIGRAM(S): at 06:30

## 2023-06-02 RX ADMIN — LIDOCAINE 1 PATCH: 4 CREAM TOPICAL at 06:21

## 2023-06-02 NOTE — PROGRESS NOTE ADULT - NUTRITIONAL ASSESSMENT
This patient has been assessed with a concern for Malnutrition and has been determined to have a diagnosis/diagnoses of Morbid obesity (BMI > 40).    This patient is being managed with:   Diet Clear Liquid-  Bariatric Clear Liquid (BARICLLIQ)  Entered: May 30 2023  5:00PM  
This patient has been assessed with a concern for Malnutrition and has been determined to have a diagnosis/diagnoses of Morbid obesity (BMI > 40).    This patient is being managed with:   Diet Clear Liquid-  Bariatric Clear Liquid (BARICLLIQ)  Entered: May 30 2023  5:00PM

## 2023-06-02 NOTE — DISCHARGE NOTE NURSING/CASE MANAGEMENT/SOCIAL WORK - NSDCFUADDAPPT_GEN_ALL_CORE_FT
Therapy followup referrals if interested:    [facility]  [email or web address]  [address]  [place], [place] [address]   [phone] ([phone] for intakes)  Medicare, Medicaid, most private insurance (including [employer])    [facility]  [email or web address]  [address] [address] [phone]  Medicare, Medicaid, Aetna, [employer], [employer], BCBS, [facility], [employer], GHI, Healthfirst, HealthPlus, [facility], [facility]  [email or web address]  [address], [place] [address] [phone]  Intake hours for new patients: Tuesdays and Thursdays at 8am  Medicare, Medicaid, most private insurance

## 2023-06-02 NOTE — PROGRESS NOTE ADULT - ASSESSMENT
37F with PMHx of MO (BMI 46), asthma, HTN, and psh of  section and back surgery who presents for elective weigthloss procedure. s/p LSG .      -BCLD/IVF  -pain/nausea control prn with zofran, tylenol, and toradol 115mg q6 12 hours   -protonix 40mg iv qd  -hyocsyamine 0.125 mg q6h  -OOB/IS/SCD  -SQH  - Give IV Robaxin  - scolopamine patch  37F with PMHx of MO (BMI 46), asthma, HTN, and psh of  section and back surgery who presents for elective weigthloss procedure. s/p LSG .      -BCLD, encourage po intake to 4oz/hour for 4 hours   -pain/nausea control prn with zofran, tylenol, and toradol 115mg q6 12 hours   -protonix 40mg iv qd  -hyocsyamine 0.125 mg q6h  -OOB/IS/SCD  -SQH  - Give IV Robaxin  - scolopamine patch   - DC later today if tolerating diet

## 2023-06-02 NOTE — DISCHARGE NOTE NURSING/CASE MANAGEMENT/SOCIAL WORK - PATIENT PORTAL LINK FT
You can access the FollowMyHealth Patient Portal offered by [facility] by registering at the following website: [email or web address]. By joining [facility]’s FollowMyHealth portal, you will also be able to view your health information using other applications (apps) compatible with our system.

## 2023-06-02 NOTE — PROGRESS NOTE ADULT - SUBJECTIVE AND OBJECTIVE BOX
SUBJECTIVE: Seen and evaluated at bedside in am. ELIF.       MEDICATIONS  (STANDING):  acetaminophen   Oral Liquid .. 650 milliGRAM(s) Oral every 6 hours  enoxaparin Injectable 40 milliGRAM(s) SubCutaneous every 12 hours  hyoscyamine SL 0.125 milliGRAM(s) SubLingual every 6 hours  lidocaine   4% Patch 1 Patch Transdermal daily  pantoprazole  Injectable 40 milliGRAM(s) IV Push daily    MEDICATIONS  (PRN):  albuterol    90 MICROgram(s) HFA Inhaler 2 Puff(s) Inhalation every 6 hours PRN Shortness of Breath and/or Wheezing  cyclobenzaprine 5 milliGRAM(s) Oral every 8 hours PRN Muscle Spasm  ondansetron Injectable 4 milliGRAM(s) IV Push every 6 hours PRN Nausea and/or Vomiting      Vital Signs Last 24 Hrs  T(C): 36.4 (02 Jun 2023 05:07), Max: 37.4 (01 Jun 2023 17:27)  T(F): 97.6 (02 Jun 2023 05:07), Max: 99.3 (01 Jun 2023 17:27)  HR: 75 (02 Jun 2023 05:07) (63 - 77)  BP: 140/84 (02 Jun 2023 05:07) (116/77 - 144/85)  BP(mean): 97 (01 Jun 2023 20:45) (97 - 97)  RR: 17 (02 Jun 2023 05:07) (16 - 17)  SpO2: 93% (02 Jun 2023 05:07) (90% - 94%)    Parameters below as of 02 Jun 2023 05:07  Patient On (Oxygen Delivery Method): room air    General: NAD, resting comfortably in bed  C/V: NSR  Pulm: Nonlabored breathing, no respiratory distress  Abd:  Soft, non-distended, TTP around incision site, incision clean dry and intact.   Extrem: WWP, no edema, SCDs in place      I&O's Summary    31 May 2023 07:01  -  01 Jun 2023 07:00  --------------------------------------------------------  IN: 1420 mL / OUT: 1350 mL / NET: 70 mL    01 Jun 2023 07:01  -  02 Jun 2023 06:16  --------------------------------------------------------  IN: 1245 mL / OUT: 2000 mL / NET: -755 mL        LABS:                        11.4   9.22  )-----------( 230      ( 01 Jun 2023 05:30 )             35.7     06-01    139  |  101  |  7   ----------------------------<  101<H>  3.6   |  28  |  0.69    Ca    8.0<L>      01 Jun 2023 05:30  Phos  3.1     06-01  Mg     1.9     06-01          CAPILLARY BLOOD GLUCOSE            RADIOLOGY & ADDITIONAL STUDIES:       SUBJECTIVE: Seen and evaluated at bedside in am. ELIF.  Endorses OOBA and mild headache. No other acute complaints. Tolerating about 3cc/hr. -z-o-a-c-sob-nettie. Pt endorses feeling ready and wanting to go home.       MEDICATIONS  (STANDING):  acetaminophen   Oral Liquid .. 650 milliGRAM(s) Oral every 6 hours  enoxaparin Injectable 40 milliGRAM(s) SubCutaneous every 12 hours  hyoscyamine SL 0.125 milliGRAM(s) SubLingual every 6 hours  lidocaine   4% Patch 1 Patch Transdermal daily  pantoprazole  Injectable 40 milliGRAM(s) IV Push daily    MEDICATIONS  (PRN):  albuterol    90 MICROgram(s) HFA Inhaler 2 Puff(s) Inhalation every 6 hours PRN Shortness of Breath and/or Wheezing  cyclobenzaprine 5 milliGRAM(s) Oral every 8 hours PRN Muscle Spasm  ondansetron Injectable 4 milliGRAM(s) IV Push every 6 hours PRN Nausea and/or Vomiting      Vital Signs Last 24 Hrs  T(C): 36.4 (02 Jun 2023 05:07), Max: 37.4 (01 Jun 2023 17:27)  T(F): 97.6 (02 Jun 2023 05:07), Max: 99.3 (01 Jun 2023 17:27)  HR: 75 (02 Jun 2023 05:07) (63 - 77)  BP: 140/84 (02 Jun 2023 05:07) (116/77 - 144/85)  BP(mean): 97 (01 Jun 2023 20:45) (97 - 97)  RR: 17 (02 Jun 2023 05:07) (16 - 17)  SpO2: 93% (02 Jun 2023 05:07) (90% - 94%)    Parameters below as of 02 Jun 2023 05:07  Patient On (Oxygen Delivery Method): room air    General: NAD, resting comfortably in bed  C/V: NSR  Pulm: Nonlabored breathing, no respiratory distress  Abd:  Soft, non-distended, TTP around incision site, incision clean dry and intact.   Extrem: WWP, no edema, SCDs in place      I&O's Summary    31 May 2023 07:01  -  01 Jun 2023 07:00  --------------------------------------------------------  IN: 1420 mL / OUT: 1350 mL / NET: 70 mL    01 Jun 2023 07:01  -  02 Jun 2023 06:16  --------------------------------------------------------  IN: 1245 mL / OUT: 2000 mL / NET: -755 mL        LABS:                        11.4   9.22  )-----------( 230      ( 01 Jun 2023 05:30 )             35.7     06-01    139  |  101  |  7   ----------------------------<  101<H>  3.6   |  28  |  0.69    Ca    8.0<L>      01 Jun 2023 05:30  Phos  3.1     06-01  Mg     1.9     06-01          CAPILLARY BLOOD GLUCOSE            RADIOLOGY & ADDITIONAL STUDIES:

## 2023-06-08 LAB — SURGICAL PATHOLOGY STUDY: SIGNIFICANT CHANGE UP

## 2023-06-12 DIAGNOSIS — I10 ESSENTIAL (PRIMARY) HYPERTENSION: ICD-10-CM

## 2023-06-12 DIAGNOSIS — R51.9 HEADACHE, UNSPECIFIED: ICD-10-CM

## 2023-06-12 DIAGNOSIS — R11.2 NAUSEA WITH VOMITING, UNSPECIFIED: ICD-10-CM

## 2023-06-12 DIAGNOSIS — Z79.899 OTHER LONG TERM (CURRENT) DRUG THERAPY: ICD-10-CM

## 2023-06-12 DIAGNOSIS — Z79.51 LONG TERM (CURRENT) USE OF INHALED STEROIDS: ICD-10-CM

## 2023-06-12 DIAGNOSIS — R14.1 GAS PAIN: ICD-10-CM

## 2023-06-12 DIAGNOSIS — M54.9 DORSALGIA, UNSPECIFIED: ICD-10-CM

## 2023-06-12 DIAGNOSIS — J45.20 MILD INTERMITTENT ASTHMA, UNCOMPLICATED: ICD-10-CM

## 2023-06-12 DIAGNOSIS — Z87.891 PERSONAL HISTORY OF NICOTINE DEPENDENCE: ICD-10-CM

## 2023-06-12 DIAGNOSIS — E83.42 HYPOMAGNESEMIA: ICD-10-CM

## 2023-06-12 DIAGNOSIS — E66.01 MORBID (SEVERE) OBESITY DUE TO EXCESS CALORIES: ICD-10-CM

## 2023-06-12 DIAGNOSIS — G89.29 OTHER CHRONIC PAIN: ICD-10-CM

## 2023-06-12 DIAGNOSIS — Z71.89 OTHER SPECIFIED COUNSELING: ICD-10-CM

## 2023-06-12 DIAGNOSIS — Z88.0 ALLERGY STATUS TO PENICILLIN: ICD-10-CM

## 2023-06-12 DIAGNOSIS — Z91.81 HISTORY OF FALLING: ICD-10-CM

## 2023-06-14 ENCOUNTER — APPOINTMENT (OUTPATIENT)
Dept: BARIATRICS | Facility: CLINIC | Age: 38
End: 2023-06-14
Payer: MEDICAID

## 2023-06-14 VITALS
WEIGHT: 272 LBS | TEMPERATURE: 97.2 F | HEART RATE: 88 BPM | SYSTOLIC BLOOD PRESSURE: 112 MMHG | DIASTOLIC BLOOD PRESSURE: 73 MMHG | OXYGEN SATURATION: 96 % | BODY MASS INDEX: 43.2 KG/M2 | HEIGHT: 66.5 IN
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
## 2023-06-14 PROBLEM — I10 ESSENTIAL (PRIMARY) HYPERTENSION: Chronic | Status: ACTIVE | Noted: 2023-05-26

## 2023-06-14 PROBLEM — L70.9 ACNE, UNSPECIFIED: Chronic | Status: ACTIVE | Noted: 2023-05-26

## 2023-06-14 PROBLEM — J45.909 UNSPECIFIED ASTHMA, UNCOMPLICATED: Chronic | Status: ACTIVE | Noted: 2023-05-26

## 2023-06-14 PROCEDURE — 99024 POSTOP FOLLOW-UP VISIT: CPT

## 2023-06-15 NOTE — ASSESSMENT
[FreeTextEntry1] : Pt is a 36 y/o F 2 weeks s/p VSG who presents today feeling well here for post op care. Denies any fevers, chest pain, SOB, calf pain, or abd pn. Pt was discharged with prophylactic lovenox 40mg qd x 2 weeks which she is taking daily as directed. Pt states she has not had a BM in 2 weeks and tried taking 6oz of MOM without any relief. Advised to start  taking metamucil daily and to take a dose of miralax today. Pt told to call me tomorrow if she still is unable to have a BM, will consider an enema. States her incisions are healing well, notes an improving skin rash on upper portion of abdomen and across breasts. Dermabond is nearly all off of her incisions, possibly whey sensitivity from protein shakes. Will switch to Orgain plant based protein drinks and monitor. Advised to use hydrocortisone over the rash, on PE seems to be improving from pt's initial description. Pt has not started taking vit and will start them today, reviewed vit regimen. States she is walking daily for exercise and has lost 20 lbs since sx. Pt is consuming adequate daily fluids and is having 1 protein shake daily containing 40g of protein. Advised to increase to 2 shakes daily and will consult with nutrition today regarding advancing diet to thin purees.

## 2023-06-15 NOTE — PLAN
[FreeTextEntry1] : nutrition\par increase protein intake\par metamucil daily, take dose of miralax today. call PA tomorrow if still unable to have a BM\par f/u 2 weeks

## 2023-06-15 NOTE — PHYSICAL EXAM
[Obese] : obese [Normal] : affect appropriate [de-identified] : incisions healing well, dermabond mostly off. no evidence of bleeding, oozing, or infection. minor rash upper portion of abdomen

## 2023-06-15 NOTE — HISTORY OF PRESENT ILLNESS
[de-identified] : Pt is a 36 y/o F 2 weeks s/p VSG who presents today feeling well here for post op care. Denies any fevers, chest pain, SOB, calf pain, or abd pn. Pt was discharged with prophylactic lovenox 40mg qd x 2 weeks which she is taking daily as directed. Pt states she has not had a BM in 2 weeks and tried taking 6oz of MOM without any relief. Advised to start  taking metamucil daily and to take a dose of miralax today. Pt told to call me tomorrow if she still is unable to have a BM, will consider an enema. States her incisions are healing well, notes an improving skin rash on upper portion of abdomen and across breasts. Dermabond is nearly all off of her incisions, possibly whey sensitivity from protein shakes. Will switch to Orgain plant based protein drinks and monitor. Advised to use hydrocortisone over the rash, on PE seems to be improving from pt's initial description. Pt has not started taking vit and will start them today, reviewed vit regimen. States she is walking daily for exercise and has lost 20 lbs since sx. Pt is consuming adequate daily fluids and is having 1 protein shake daily containing 40g of protein. Advised to increase to 2 shakes daily and will consult with nutrition today regarding advancing diet to thin purees.

## 2023-06-15 NOTE — ADDENDUM
[FreeTextEntry1] : I, Dr. Gi Joyce, spent 25 minutes with the patient >50% counseling/coordination of care including, reviewing the patient's history, performing an examination, reviewing relevant labs and radiographic imaging, reviewing PCP and consultant notes, discussion of medical and surgical management of the diagnosis as well as associated risks and benefits, and completing documentation.\par

## 2023-06-20 ENCOUNTER — NON-APPOINTMENT (OUTPATIENT)
Age: 38
End: 2023-06-20

## 2023-06-20 RX ORDER — (SALINE) 19; 7 G/133ML; G/133ML
ENEMA RECTAL
Qty: 2 | Refills: 0 | Status: ACTIVE | COMMUNITY
Start: 2023-06-20 | End: 1900-01-01

## 2023-06-28 ENCOUNTER — APPOINTMENT (OUTPATIENT)
Dept: BARIATRICS | Facility: CLINIC | Age: 38
End: 2023-06-28
Payer: MEDICAID

## 2023-06-28 VITALS
SYSTOLIC BLOOD PRESSURE: 105 MMHG | TEMPERATURE: 97.5 F | HEART RATE: 80 BPM | BODY MASS INDEX: 42.65 KG/M2 | OXYGEN SATURATION: 97 % | DIASTOLIC BLOOD PRESSURE: 73 MMHG | WEIGHT: 268.56 LBS | HEIGHT: 66.5 IN

## 2023-06-28 PROCEDURE — 99024 POSTOP FOLLOW-UP VISIT: CPT

## 2023-06-28 NOTE — PLAN
[FreeTextEntry1] : increase fluid and protein intake\par eating q2-3 hrs, 2 protein shakes daily\par metamucil daily, increase dietary fiber\par vit compliance\par nutrition consult\par f/u 1 week

## 2023-06-28 NOTE — HISTORY OF PRESENT ILLNESS
[de-identified] : Pt is a 38 y/o F 1 mo s/p VSG who presents today feeling well for post op care. Pt admits to not meeting her daily fluid or protein requirements, reporting about 30-40oz of fluids and about 30g of protein daily. Pt states she has a few sips of protein in the morning and then eats at 7pm. Pt states she is tolerating solid foods including fish and chicken but states she is not eating often because she is very busy at home with her children and notes having special needs children. Counseled the pt at length about our dietary recommendations including eating q2-3 hrs, consuming 60g of protein daily and 64 oz of fluids daily, and the importance of consuming dietary fiber from fruits and vegetables. Pt understands these requirements and will work on eating and drinking more each day and establishing a daily regimen. Pt does ot lightheadedness when changing position which is likely dehydration related. Pt appears stable and well today in office and VS are wnl. States she has not been compliant with her daily vit, reviewed vit regimen and explained the importance of vit compliance at length. Reports some n,v, reflux, explained to pt these symptoms are likely due to fasting for several hours between her meals and is only eating about 1 time per day. Will monitor symptoms, likely due to poor PO intake. Pt was previously suffering from severe constipation post op and tried and failed MOM, miralax, and a suppository. Pt was rx'd an enema which allowed her to move her bowels, states her last BM was about 1 week ago. Pt understands the importance of consuming adequate daily fiber, fluids, and overall PO intake in order to have more regular BMs and will work on this. Pt does reports some L sided abd pn, possibly related to ongoing constipation. Incisions are healed and no evidence of hernia on PE. Recommended taking metamucil daily to aid her BMs. Pt states she has been walking throughout the week for exercise and has lost 31 lbs since sx. Pt to consult with nutrition today and will f/u here in 1 week to assess PO intake.

## 2023-06-28 NOTE — ASSESSMENT
[FreeTextEntry1] : Pt is a 36 y/o F 1 mo s/p VSG who presents today feeling well for post op care. Pt admits to not meeting her daily fluid or protein requirements, reporting about 30-40oz of fluids and about 30g of protein daily. Pt states she has a few sips of protein in the morning and then eats at 7pm. Pt states she is tolerating solid foods including fish and chicken but states she is not eating often because she is very busy at home with her children and notes having special needs children. Counseled the pt at length about our dietary recommendations including eating q2-3 hrs, consuming 60g of protein daily and 64 oz of fluids daily, and the importance of consuming dietary fiber from fruits and vegetables. Pt understands these requirements and will work on eating and drinking more each day and establishing a daily regimen. Pt does ot lightheadedness when changing position which is likely dehydration related. Pt appears stable and well today in office and VS are wnl. States she has not been compliant with her daily vit, reviewed vit regimen and explained the importance of vit compliance at length. Reports some n,v, reflux, explained to pt these symptoms are likely due to fasting for several hours between her meals and is only eating about 1 time per day. Will monitor symptoms, likely due to poor PO intake. Pt was previously suffering from severe constipation post op and tried and failed MOM, miralax, and a suppository. Pt was rx'd an enema which allowed her to move her bowels, states her last BM was about 1 week ago. Pt understands the importance of consuming adequate daily fiber, fluids, and overall PO intake in order to have more regular BMs and will work on this. Pt does reports some L sided abd pn, possibly related to ongoing constipation. Incisions are healed and no evidence of hernia on PE. Recommended taking metamucil daily to aid her BMs. Pt states she has been walking throughout the week for exercise and has lost 31 lbs since sx. Pt to consult with nutrition today and will f/u here in 1 week to assess PO intake.

## 2023-07-05 ENCOUNTER — APPOINTMENT (OUTPATIENT)
Dept: BARIATRICS | Facility: CLINIC | Age: 38
End: 2023-07-05

## 2023-08-09 DIAGNOSIS — Z98.84 BARIATRIC SURGERY STATUS: ICD-10-CM

## 2023-08-09 DIAGNOSIS — Z00.00 ENCOUNTER FOR GENERAL ADULT MEDICAL EXAMINATION W/OUT ABNORMAL FINDINGS: ICD-10-CM

## 2023-12-20 NOTE — PRE-ANESTHESIA EVALUATION ADULT - NS MD HP INPLANTS MED DEV
Patient has no significant new complaints.  Tolerating diet, no N/V.  Pain controlled.  Leyva in place.  IR drainage yesterday - fluid output from drain appears serous.  Abdomen is soft, nondistended, midline incision C/D/I without erythema or discharge.  Surgical drain clear, serous output, IR drain serous output as well - fluid without organisms seen on gram stain, cultures pending.  Labs reviewed - unclear why patient had multiple CBCs sent but earlier WBC was noted at 18, most recent was 20 (stable from yesterday).  Remains afebrile and vitals, WNL, stoma functioning.  Will check UA, CXR, and discuss with ID whether antifungal should potentially be added to regimen given feculent peritonitis on presentation.  IS, encourage ambulation.  Leyva in place for retention, follow up with Urology.  Low fiber diet.  From CRS standpoint can be converted to oral anticoagulation.  Can also remove surgical drain in RLQ. None

## 2024-01-29 NOTE — PRE-ANESTHESIA EVALUATION ADULT - NSANTHLABRESULTSFT_GEN_ALL_CORE
Pt seen for bedside swallow eval. AA+Ox0. Pt nonverbal and fatigued, but rousable to tactile and verbal stimuli. HOB elevated to 90°. Pt edentulous, open mouth posture at rest. PO trials of ice chips, thin liquids, and puree were administered. Puree trial was attempted; however, pt refused bolus into oral cavity. As per MEDARDO Bautista, pt daughter comes in to feed puree food in which it was reported that pt takes "well". Pt took in minimal amounts of boluses throughout eval.
All available laboratories reviewed: CBC, CMP, PT/INR/PTT

## 2024-10-31 NOTE — ASSESSMENT
[FreeTextEntry1] : Pt is a 36 y/o F with pmhx of morbid obesity BMI 46, asthma on albuterol prn, who presents today feeling well here for consultation for bariatric sx. Pt states she has struggled with her wt after giving birth to her 6th child a few years ago, states she has 9 children. Reports past sxhx of 2 c sections of spinal surgery for hematoma evacuation following a fall. Denies any reflux or regurgitation. States she quit smoking 5 yrs ago, reports social alcohol use. 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 States she has seen our online seminar and is interested in the VSG. 
same name as above

## 2025-05-21 ENCOUNTER — APPOINTMENT (OUTPATIENT)
Dept: BARIATRICS | Facility: CLINIC | Age: 40
End: 2025-05-21

## 2025-05-28 ENCOUNTER — APPOINTMENT (OUTPATIENT)
Dept: SURGERY | Facility: CLINIC | Age: 40
End: 2025-05-28

## 2025-06-02 ENCOUNTER — APPOINTMENT (OUTPATIENT)
Dept: BARIATRICS | Facility: CLINIC | Age: 40
End: 2025-06-02
Payer: MEDICAID

## 2025-06-02 VITALS
DIASTOLIC BLOOD PRESSURE: 82 MMHG | WEIGHT: 249 LBS | SYSTOLIC BLOOD PRESSURE: 120 MMHG | TEMPERATURE: 98 F | HEART RATE: 68 BPM | OXYGEN SATURATION: 98 % | BODY MASS INDEX: 39.54 KG/M2 | HEIGHT: 66.5 IN

## 2025-06-02 DIAGNOSIS — R12 HEARTBURN: ICD-10-CM

## 2025-06-02 DIAGNOSIS — Z98.84 BARIATRIC SURGERY STATUS: ICD-10-CM

## 2025-06-02 DIAGNOSIS — R10.9 UNSPECIFIED ABDOMINAL PAIN: ICD-10-CM

## 2025-06-02 DIAGNOSIS — Z00.00 ENCOUNTER FOR GENERAL ADULT MEDICAL EXAMINATION W/OUT ABNORMAL FINDINGS: ICD-10-CM

## 2025-06-02 PROCEDURE — 99214 OFFICE O/P EST MOD 30 MIN: CPT

## 2025-06-02 PROCEDURE — G2211 COMPLEX E/M VISIT ADD ON: CPT | Mod: NC

## 2025-06-02 RX ORDER — CYANOCOBALAMIN (VITAMIN B-12) 2500 MCG
2500 TABLET, SUBLINGUAL SUBLINGUAL
Qty: 12 | Refills: 4 | Status: ACTIVE | COMMUNITY
Start: 2025-06-02 | End: 1900-01-01

## 2025-06-02 RX ORDER — ELASTIC BANDAGE 1"X2.2YD
315-6.25 BANDAGE TOPICAL
Qty: 60 | Refills: 5 | Status: ACTIVE | COMMUNITY
Start: 2025-06-02 | End: 1900-01-01

## 2025-06-03 LAB
25(OH)D3 SERPL-MCNC: 17.1 NG/ML
ALBUMIN SERPL ELPH-MCNC: 4.3 G/DL
ALP BLD-CCNC: 69 U/L
ALT SERPL-CCNC: 9 U/L
ANION GAP SERPL CALC-SCNC: 15 MMOL/L
APTT BLD: 29 SEC
AST SERPL-CCNC: 16 U/L
BASOPHILS # BLD AUTO: 0.05 K/UL
BASOPHILS NFR BLD AUTO: 0.7 %
BILIRUB SERPL-MCNC: 0.4 MG/DL
BUN SERPL-MCNC: 7 MG/DL
CALCIUM SERPL-MCNC: 9.4 MG/DL
CALCIUM SERPL-MCNC: 9.4 MG/DL
CHLORIDE SERPL-SCNC: 100 MMOL/L
CHOLEST SERPL-MCNC: 180 MG/DL
CO2 SERPL-SCNC: 26 MMOL/L
CREAT SERPL-MCNC: 0.62 MG/DL
EGFRCR SERPLBLD CKD-EPI 2021: 115 ML/MIN/1.73M2
EOSINOPHIL # BLD AUTO: 0.1 K/UL
EOSINOPHIL NFR BLD AUTO: 1.5 %
ESTIMATED AVERAGE GLUCOSE: 91 MG/DL
FOLATE SERPL-MCNC: 12.7 NG/ML
GLUCOSE SERPL-MCNC: 95 MG/DL
HBA1C MFR BLD HPLC: 4.8 %
HCT VFR BLD CALC: 40.9 %
HDLC SERPL-MCNC: 61 MG/DL
HGB BLD-MCNC: 13 G/DL
IMM GRANULOCYTES NFR BLD AUTO: 0.3 %
INR PPP: 1.03 RATIO
IRON SATN MFR SERPL: 25 %
IRON SERPL-MCNC: 90 UG/DL
LDLC SERPL-MCNC: 100 MG/DL
LYMPHOCYTES # BLD AUTO: 2.48 K/UL
LYMPHOCYTES NFR BLD AUTO: 36.6 %
MAN DIFF?: NORMAL
MCHC RBC-ENTMCNC: 29.7 PG
MCHC RBC-ENTMCNC: 31.8 G/DL
MCV RBC AUTO: 93.4 FL
MONOCYTES # BLD AUTO: 0.45 K/UL
MONOCYTES NFR BLD AUTO: 6.6 %
NEUTROPHILS # BLD AUTO: 3.67 K/UL
NEUTROPHILS NFR BLD AUTO: 54.3 %
NONHDLC SERPL-MCNC: 119 MG/DL
PARATHYROID HORMONE INTACT: 65 PG/ML
PLATELET # BLD AUTO: 308 K/UL
POTASSIUM SERPL-SCNC: 3.9 MMOL/L
PREALB SERPL NEPH-MCNC: 24 MG/DL
PROT SERPL-MCNC: 7.6 G/DL
PT BLD: 12.2 SEC
RBC # BLD: 4.38 M/UL
RBC # FLD: 11.6 %
SODIUM SERPL-SCNC: 141 MMOL/L
TIBC SERPL-MCNC: 364 UG/DL
TRIGL SERPL-MCNC: 105 MG/DL
TSH SERPL-ACNC: 1.63 UIU/ML
UIBC SERPL-MCNC: 274 UG/DL
VIT B12 SERPL-MCNC: 353 PG/ML
WBC # FLD AUTO: 6.77 K/UL

## 2025-06-03 RX ORDER — ERGOCALCIFEROL 1.25 MG/1
1.25 MG CAPSULE, LIQUID FILLED ORAL
Qty: 12 | Refills: 0 | Status: ACTIVE | COMMUNITY
Start: 2025-06-03 | End: 1900-01-01

## 2025-06-04 ENCOUNTER — NON-APPOINTMENT (OUTPATIENT)
Age: 40
End: 2025-06-04

## 2025-06-13 LAB
A-TOCOPHEROL VIT E SERPL-MCNC: 12.8 MG/L
BETA+GAMMA TOCOPHEROL SERPL-MCNC: 1.2 MG/L
CA-I SERPL-SCNC: 5.1 MG/DL
VIT A SERPL-MCNC: 34.5 UG/DL
VIT B1 SERPL-MCNC: 145.8 NMOL/L
ZINC SERPL-MCNC: 73 UG/DL

## 2025-06-30 ENCOUNTER — TRANSCRIPTION ENCOUNTER (OUTPATIENT)
Age: 40
End: 2025-06-30

## 2025-06-30 ENCOUNTER — RESULT REVIEW (OUTPATIENT)
Age: 40
End: 2025-06-30

## 2025-06-30 ENCOUNTER — OUTPATIENT (OUTPATIENT)
Dept: OUTPATIENT SERVICES | Facility: HOSPITAL | Age: 40
LOS: 1 days | Discharge: HOME | End: 2025-06-30
Payer: COMMERCIAL

## 2025-06-30 VITALS
WEIGHT: 248.9 LBS | DIASTOLIC BLOOD PRESSURE: 83 MMHG | OXYGEN SATURATION: 99 % | RESPIRATION RATE: 16 BRPM | SYSTOLIC BLOOD PRESSURE: 123 MMHG | HEART RATE: 66 BPM | HEIGHT: 66 IN

## 2025-06-30 DIAGNOSIS — Z98.890 OTHER SPECIFIED POSTPROCEDURAL STATES: Chronic | ICD-10-CM

## 2025-06-30 DIAGNOSIS — Z98.891 HISTORY OF UTERINE SCAR FROM PREVIOUS SURGERY: Chronic | ICD-10-CM

## 2025-06-30 PROCEDURE — 43239 EGD BIOPSY SINGLE/MULTIPLE: CPT

## 2025-06-30 PROCEDURE — C1889: CPT

## 2025-06-30 PROCEDURE — 91035 G-ESOPH REFLX TST W/ELECTROD: CPT | Mod: 26

## 2025-06-30 PROCEDURE — 91035 G-ESOPH REFLX TST W/ELECTROD: CPT

## 2025-06-30 PROCEDURE — 88305 TISSUE EXAM BY PATHOLOGIST: CPT | Mod: 26

## 2025-06-30 PROCEDURE — 88305 TISSUE EXAM BY PATHOLOGIST: CPT

## 2025-06-30 DEVICE — IMPLANTABLE DEVICE: Type: IMPLANTABLE DEVICE | Status: FUNCTIONAL

## 2025-08-25 ENCOUNTER — APPOINTMENT (OUTPATIENT)
Dept: SURGERY | Facility: CLINIC | Age: 40
End: 2025-08-25
Payer: MEDICAID

## 2025-08-25 VITALS
DIASTOLIC BLOOD PRESSURE: 88 MMHG | TEMPERATURE: 98.1 F | SYSTOLIC BLOOD PRESSURE: 131 MMHG | BODY MASS INDEX: 40.02 KG/M2 | HEIGHT: 66.5 IN | OXYGEN SATURATION: 98 % | WEIGHT: 252 LBS | HEART RATE: 69 BPM

## 2025-08-25 DIAGNOSIS — Z00.00 ENCOUNTER FOR GENERAL ADULT MEDICAL EXAMINATION W/OUT ABNORMAL FINDINGS: ICD-10-CM

## 2025-08-25 DIAGNOSIS — R12 HEARTBURN: ICD-10-CM

## 2025-08-25 DIAGNOSIS — K21.9 GASTRO-ESOPHAGEAL REFLUX DISEASE W/OUT ESOPHAGITIS: ICD-10-CM

## 2025-08-25 DIAGNOSIS — R10.9 UNSPECIFIED ABDOMINAL PAIN: ICD-10-CM

## 2025-08-25 DIAGNOSIS — Z98.84 BARIATRIC SURGERY STATUS: ICD-10-CM

## 2025-08-25 PROCEDURE — 99214 OFFICE O/P EST MOD 30 MIN: CPT

## 2025-08-25 PROCEDURE — G2211 COMPLEX E/M VISIT ADD ON: CPT | Mod: NC

## 2025-08-25 RX ORDER — OMEPRAZOLE 40 MG/1
40 CAPSULE, DELAYED RELEASE ORAL
Qty: 30 | Refills: 0 | Status: ACTIVE | COMMUNITY
Start: 2025-08-25 | End: 1900-01-01

## 2025-08-26 ENCOUNTER — APPOINTMENT (OUTPATIENT)
Dept: BARIATRICS | Facility: CLINIC | Age: 40
End: 2025-08-26

## 2025-09-05 ENCOUNTER — NON-APPOINTMENT (OUTPATIENT)
Age: 40
End: 2025-09-05

## 2025-09-05 LAB
25(OH)D3 SERPL-MCNC: 21.9 NG/ML
A-TOCOPHEROL VIT E SERPL-MCNC: 12 MG/L
ALBUMIN SERPL ELPH-MCNC: 4.6 G/DL
ALP BLD-CCNC: 70 U/L
ALT SERPL-CCNC: 13 U/L
ANION GAP SERPL CALC-SCNC: 14 MMOL/L
APTT BLD: 31.3 SEC
AST SERPL-CCNC: 22 U/L
BASOPHILS # BLD AUTO: 0.05 K/UL
BASOPHILS NFR BLD AUTO: 0.6 %
BETA+GAMMA TOCOPHEROL SERPL-MCNC: 1.1 MG/L
BILIRUB SERPL-MCNC: 0.4 MG/DL
BUN SERPL-MCNC: 8 MG/DL
CA-I SERPL-SCNC: 5.2 MG/DL
CALCIUM SERPL-MCNC: 9.9 MG/DL
CALCIUM SERPL-MCNC: 9.9 MG/DL
CHLORIDE SERPL-SCNC: 101 MMOL/L
CHOLEST SERPL-MCNC: 185 MG/DL
CO2 SERPL-SCNC: 27 MMOL/L
CREAT SERPL-MCNC: 0.64 MG/DL
EGFRCR SERPLBLD CKD-EPI 2021: 114 ML/MIN/1.73M2
EOSINOPHIL # BLD AUTO: 0.05 K/UL
EOSINOPHIL NFR BLD AUTO: 0.6 %
ESTIMATED AVERAGE GLUCOSE: 94 MG/DL
FOLATE SERPL-MCNC: >20 NG/ML
GLUCOSE SERPL-MCNC: 99 MG/DL
HBA1C MFR BLD HPLC: 4.9 %
HCT VFR BLD CALC: 39.4 %
HDLC SERPL-MCNC: 69 MG/DL
HGB BLD-MCNC: 12.6 G/DL
IMM GRANULOCYTES NFR BLD AUTO: 0.1 %
INR PPP: 1.08 RATIO
IRON SATN MFR SERPL: 22 %
IRON SERPL-MCNC: 83 UG/DL
LDLC SERPL-MCNC: 99 MG/DL
LYMPHOCYTES # BLD AUTO: 2.15 K/UL
LYMPHOCYTES NFR BLD AUTO: 27.9 %
MAN DIFF?: NORMAL
MCHC RBC-ENTMCNC: 29.4 PG
MCHC RBC-ENTMCNC: 32 G/DL
MCV RBC AUTO: 91.8 FL
MONOCYTES # BLD AUTO: 0.46 K/UL
MONOCYTES NFR BLD AUTO: 6 %
NEUTROPHILS # BLD AUTO: 4.99 K/UL
NEUTROPHILS NFR BLD AUTO: 64.8 %
NONHDLC SERPL-MCNC: 117 MG/DL
PARATHYROID HORMONE INTACT: 59 PG/ML
PLATELET # BLD AUTO: 345 K/UL
POTASSIUM SERPL-SCNC: 4 MMOL/L
PREALB SERPL NEPH-MCNC: 26 MG/DL
PROT SERPL-MCNC: 7.8 G/DL
PT BLD: 12.5 SEC
RBC # BLD: 4.29 M/UL
RBC # FLD: 12.6 %
SODIUM SERPL-SCNC: 142 MMOL/L
TIBC SERPL-MCNC: 374 UG/DL
TRIGL SERPL-MCNC: 97 MG/DL
TSH SERPL-ACNC: 2.41 UIU/ML
UIBC SERPL-MCNC: 291 UG/DL
VIT A SERPL-MCNC: 42.4 UG/DL
VIT B1 SERPL-MCNC: 155.8 NMOL/L
VIT B12 SERPL-MCNC: 359 PG/ML
WBC # FLD AUTO: 7.71 K/UL
ZINC SERPL-MCNC: 75 UG/DL

## 2025-09-05 RX ORDER — ERGOCALCIFEROL 1.25 MG/1
1.25 MG CAPSULE, LIQUID FILLED ORAL
Qty: 8 | Refills: 0 | Status: ACTIVE | COMMUNITY
Start: 2025-09-05 | End: 1900-01-01

## 2025-09-08 ENCOUNTER — NON-APPOINTMENT (OUTPATIENT)
Age: 40
End: 2025-09-08

## 2025-09-16 ENCOUNTER — APPOINTMENT (OUTPATIENT)
Dept: ULTRASOUND IMAGING | Facility: HOSPITAL | Age: 40
End: 2025-09-16
Payer: MEDICAID

## 2025-09-16 PROCEDURE — 76700 US EXAM ABDOM COMPLETE: CPT | Mod: 26

## 2025-09-17 ENCOUNTER — APPOINTMENT (OUTPATIENT)
Dept: BARIATRICS | Facility: CLINIC | Age: 40
End: 2025-09-17
Payer: MEDICAID

## 2025-09-17 PROCEDURE — 97803 MED NUTRITION INDIV SUBSEQ: CPT | Mod: NC,95

## (undated) DEVICE — NDL SPINAL 22G X 3.5" (BLACK)

## (undated) DEVICE — MARKING PEN W RULER

## (undated) DEVICE — INSUFFLATION NDL COVIDIEN SURGINEEDLE VERESS 120MM

## (undated) DEVICE — DRAPE 3/4 SHEET 52X76"

## (undated) DEVICE — XI DRAPE COLUMN

## (undated) DEVICE — XI DRAPE ARM

## (undated) DEVICE — XI STAPLER SUREFORM 60

## (undated) DEVICE — GLV 7 PROTEXIS (WHITE)

## (undated) DEVICE — SUT VLOC 180 3-0 6" V-20 GREEN

## (undated) DEVICE — XI SEAL UNIV 5- 8 MM

## (undated) DEVICE — POSITIONER FOAM EGG CRATE ULNAR 2PCS (PINK)

## (undated) DEVICE — TUBING IV EXTENSION 30"

## (undated) DEVICE — APPLICATOR FOR ARISTA XL 38CM

## (undated) DEVICE — ELCTR GROUNDING PAD ADULT COVIDIEN

## (undated) DEVICE — ELCTR BOVIE PENCIL BLADE 10FT

## (undated) DEVICE — SUT MONOCRYL 4-0 18" PS-2

## (undated) DEVICE — ELCTR BOVIE PENCIL HANDPIECE ROCKER SWITCH 15FT

## (undated) DEVICE — PREP CHLORAPREP HI-LITE ORANGE 26ML

## (undated) DEVICE — DRAPE 1/2 SHEET 40X57"

## (undated) DEVICE — SUT VICRYL 0 54" TIES

## (undated) DEVICE — D HELP - CLEARVIEW CLEARIFY SYSTEM

## (undated) DEVICE — XI TIP COVER

## (undated) DEVICE — Device

## (undated) DEVICE — PACK GENERAL LAPAROSCOPY

## (undated) DEVICE — TROCAR COVIDIEN VERSAPORT BLADELESS OPTICAL 5MM STANDARD

## (undated) DEVICE — DRSG GAUZE PACKTNER ROLL

## (undated) DEVICE — GOWN XL

## (undated) DEVICE — FORCEP RADIAL JAW 4 W NDL 2.2MM 2.8MM 240CM ORANGE DISP

## (undated) DEVICE — VENODYNE/SCD SLEEVE CALF MEDIUM

## (undated) DEVICE — TUBING STRYKER PNEUMOSURE HI FLOW INSUFFLATOR

## (undated) DEVICE — XI OBTURATOR OPTICAL BLADELESS 8MM

## (undated) DEVICE — SYR LUER LOK 30CC

## (undated) DEVICE — DRSG DERMABOND 0.7ML

## (undated) DEVICE — XI ENDOWRIST 12 - 8 MM CANNULA REDUCER

## (undated) DEVICE — WARMING BLANKET UPPER ADULT

## (undated) DEVICE — ENDOCATCH 10MM SPECIMEN POUCH

## (undated) DEVICE — XI VESSEL SEALER

## (undated) DEVICE — XI 12MM AND STAPLER CANNULA SEAL